# Patient Record
Sex: FEMALE | Race: WHITE | Employment: FULL TIME | URBAN - METROPOLITAN AREA
[De-identification: names, ages, dates, MRNs, and addresses within clinical notes are randomized per-mention and may not be internally consistent; named-entity substitution may affect disease eponyms.]

---

## 2017-05-09 ENCOUNTER — TRANSFERRED RECORDS (OUTPATIENT)
Dept: HEALTH INFORMATION MANAGEMENT | Facility: CLINIC | Age: 52
End: 2017-05-09

## 2017-05-12 ENCOUNTER — CARE COORDINATION (OUTPATIENT)
Dept: CARDIOLOGY | Facility: CLINIC | Age: 52
End: 2017-05-12

## 2017-05-12 DIAGNOSIS — R00.2 PALPITATIONS: ICD-10-CM

## 2017-05-12 DIAGNOSIS — Z82.49 FAMILY HISTORY OF CARDIOMYOPATHY: Primary | ICD-10-CM

## 2017-05-26 ENCOUNTER — ALLIED HEALTH/NURSE VISIT (OUTPATIENT)
Dept: CARDIOLOGY | Facility: CLINIC | Age: 52
End: 2017-05-26
Attending: INTERNAL MEDICINE
Payer: COMMERCIAL

## 2017-05-26 ENCOUNTER — HOSPITAL ENCOUNTER (OUTPATIENT)
Dept: MRI IMAGING | Facility: CLINIC | Age: 52
Discharge: HOME | End: 2017-05-26
Attending: INTERNAL MEDICINE | Admitting: INTERNAL MEDICINE
Payer: COMMERCIAL

## 2017-05-26 DIAGNOSIS — R00.2 PALPITATIONS: Primary | ICD-10-CM

## 2017-05-26 DIAGNOSIS — Z82.49 FAMILY HISTORY OF CARDIOMYOPATHY: ICD-10-CM

## 2017-05-26 PROCEDURE — 75561 CARDIAC MRI FOR MORPH W/DYE: CPT | Mod: 26 | Performed by: INTERNAL MEDICINE

## 2017-05-26 PROCEDURE — 0296T ZIO PATCH HOLTER: CPT | Mod: ZF

## 2017-05-26 PROCEDURE — 25000128 H RX IP 250 OP 636: Performed by: INTERNAL MEDICINE

## 2017-05-26 PROCEDURE — 0296T ZZHC  EXT ECG > 48HR TO 21 DAY RCRD W/CONECT INTL RCRD: CPT | Mod: ZF

## 2017-05-26 PROCEDURE — 75561 CARDIAC MRI FOR MORPH W/DYE: CPT

## 2017-05-26 PROCEDURE — 0298T ZZC EXT ECG > 48HR TO 21 DAY REVIEW AND INTERPRETATN: CPT | Performed by: INTERNAL MEDICINE

## 2017-05-26 PROCEDURE — A9585 GADOBUTROL INJECTION: HCPCS | Performed by: INTERNAL MEDICINE

## 2017-05-26 RX ORDER — GADOBUTROL 604.72 MG/ML
10 INJECTION INTRAVENOUS ONCE
Status: COMPLETED | OUTPATIENT
Start: 2017-05-26 | End: 2017-05-26

## 2017-05-26 RX ADMIN — GADOBUTROL 10 ML: 604.72 INJECTION INTRAVENOUS at 09:54

## 2017-05-26 NOTE — NURSING NOTE
Per Dr. Madison Sorenson, patient to have 48hr Zio monitor placed.  Diagnosis: Palpitations  Monitor placed: Yes  Patient Instructed: Yes  Patient verbalized understanding: Yes  Holter # [id number]  Documented by MAMIE Campbell and placed by Tamara Rodríguez

## 2017-05-26 NOTE — MR AVS SNAPSHOT
After Visit Summary   5/26/2017    Uzma Clemons    MRN: [id number]           Patient Information     Date Of Birth          1965        Visit Information        Provider Department      5/26/2017 11:00 AM Tech, Uc Cvc Monitor, [facility]        Today's Diagnoses     Palpitations    -  1       Follow-ups after your visit        Who to contact     If you have questions or need follow up information about today's clinic visit or your schedule please contact [facility] directly at [phone].  Normal or non-critical lab and imaging results will be communicated to you by ffk environmenthart, letter or phone within 4 business days after the clinic has received the results. If you do not hear from us within 7 days, please contact the clinic through ffk environmenthart or phone. If you have a critical or abnormal lab result, we will notify you by phone as soon as possible.  Submit refill requests through Nuve or call your pharmacy and they will forward the refill request to us. Please allow 3 business days for your refill to be completed.          Additional Information About Your Visit        MyChart Information     Nuve gives you secure access to your electronic health record. If you see a primary care provider, you can also send messages to your care team and make appointments. If you have questions, please call your primary care clinic.  If you do not have a primary care provider, please call [phone] and they will assist you.        Care EveryWhere ID     This is your Care EveryWhere ID. This could be used by other organizations to access your [place] medical records  [id number]         Blood Pressure from Last 3 Encounters:   11/28/16 106/64   10/20/15 112/80   08/19/14 122/70    Weight from Last 3 Encounters:   11/28/16 75.3 kg (166 lb)   10/20/15 74.8 kg (165 lb)   08/19/14 70.8 kg (156 lb)              We Performed the Following     Holter monitor 48 hour     Ziopatch Holter  Monitor - Adult        Primary Care Provider Office Phone # Fax #    Radha Patel [phone]       [facility] [address] [address]        Thank you!     Thank you for choosing [facility]  for your care. Our goal is always to provide you with excellent care. Hearing back from our patients is one way we can continue to improve our services. Please take a few minutes to complete the written survey that you may receive in the mail after your visit with us. Thank you!             Your Updated Medication List - Protect others around you: Learn how to safely use, store and throw away your medicines at www.disposemymeds.org.          This list is accurate as of: 5/26/17 11:47 AM.  Always use your most recent med list.                   Brand Name Dispense Instructions for use    EFFEXOR XR 37.5 MG 24 hr capsule   Generic drug:  venlafaxine      Take 37.5 mg by mouth daily       flecainide acetate 150 MG Tabs     90 tablet    1/2 pill by mouth every 12 hours       metoprolol 50 MG tablet    LOPRESSOR     Take 50 mg by mouth 2 times daily       order for DME      CPAP every night       vitamin D 1000 UNITS capsule      Take 1 capsule by mouth daily       ZOMIG ZMT 2.5 MG ODT tab   Generic drug:  ZOLMitriptan     12    1 TAB AT THE START OF THE ATTACK, THEN AS DIRECT

## 2017-06-17 ENCOUNTER — HEALTH MAINTENANCE LETTER (OUTPATIENT)
Age: 52
End: 2017-06-17

## 2017-08-17 ENCOUNTER — PRE VISIT (OUTPATIENT)
Dept: CARDIOLOGY | Facility: CLINIC | Age: 52
End: 2017-08-17

## 2017-08-18 ENCOUNTER — OFFICE VISIT (OUTPATIENT)
Dept: CARDIOLOGY | Facility: CLINIC | Age: 52
End: 2017-08-18
Attending: INTERNAL MEDICINE
Payer: COMMERCIAL

## 2017-08-18 ENCOUNTER — OFFICE VISIT (OUTPATIENT)
Dept: CARDIOLOGY | Facility: CLINIC | Age: 52
End: 2017-08-18
Attending: GENETIC COUNSELOR, MS
Payer: COMMERCIAL

## 2017-08-18 VITALS
BODY MASS INDEX: 25.46 KG/M2 | HEIGHT: 68 IN | DIASTOLIC BLOOD PRESSURE: 95 MMHG | OXYGEN SATURATION: 98 % | SYSTOLIC BLOOD PRESSURE: 145 MMHG | HEART RATE: 66 BPM | WEIGHT: 168 LBS

## 2017-08-18 DIAGNOSIS — R00.2 PALPITATIONS: ICD-10-CM

## 2017-08-18 DIAGNOSIS — I48.0 PAROXYSMAL ATRIAL FIBRILLATION (H): Primary | ICD-10-CM

## 2017-08-18 DIAGNOSIS — Z82.41 FAMILY HISTORY OF SUDDEN CARDIAC DEATH IN BROTHER: Primary | ICD-10-CM

## 2017-08-18 DIAGNOSIS — I42.8 ARRHYTHMOGENIC LEFT VENTRICULAR DYSPLASIA (H): ICD-10-CM

## 2017-08-18 PROCEDURE — 96040 ZZH GENETIC COUNSELING, EACH 30 MINUTES: CPT | Mod: ZF | Performed by: GENETIC COUNSELOR, MS

## 2017-08-18 PROCEDURE — 99212 OFFICE O/P EST SF 10 MIN: CPT | Mod: ZF

## 2017-08-18 PROCEDURE — 93005 ELECTROCARDIOGRAM TRACING: CPT | Mod: ZF

## 2017-08-18 PROCEDURE — 93010 ELECTROCARDIOGRAM REPORT: CPT | Mod: ZP | Performed by: INTERNAL MEDICINE

## 2017-08-18 PROCEDURE — 99212 OFFICE O/P EST SF 10 MIN: CPT | Mod: ZP | Performed by: INTERNAL MEDICINE

## 2017-08-18 RX ORDER — METOPROLOL SUCCINATE 50 MG/1
50 TABLET, EXTENDED RELEASE ORAL DAILY
Qty: 90 TABLET | Refills: 3 | Status: SHIPPED | OUTPATIENT
Start: 2017-08-18 | End: 2017-08-18

## 2017-08-18 RX ORDER — METOPROLOL SUCCINATE 50 MG/1
50 TABLET, EXTENDED RELEASE ORAL DAILY
Qty: 90 TABLET | Refills: 3 | Status: SHIPPED | OUTPATIENT
Start: 2017-08-18 | End: 2020-02-12

## 2017-08-18 ASSESSMENT — PAIN SCALES - GENERAL: PAINLEVEL: NO PAIN (0)

## 2017-08-18 NOTE — LETTER
Date:October 10, 2017      Provider requested that no letter be sent. Do not send.       [facility] Health Information

## 2017-08-18 NOTE — MR AVS SNAPSHOT
"              After Visit Summary   8/18/2017    Uzma Clemons    MRN: [id number]           Patient Information     Date Of Birth          1965        Visit Information        Provider Department      8/18/2017 10:00 AM Madison Sorenson MD [facility]        Today's Diagnoses     Atrial fibrillation (H)    -  1    Arrhythmogenic left ventricular dysplasia (H)          Care Instructions    You were seen today in the Adult Congenital and Cardiovascular Genetics Clinic at the [facility].    Cardiology Providers you saw during your visit:  Dr. Madison Sorenson     Diagnosis:  Family History of Possible LDAC    Results:  The results of your MRI and Holter were discussed with you today.    Recommendations:    1.  Continue to eat a heart healthy, low salt diet.  2.  Please follow the exercise restrictions outlined by Dr. Sorenson - please refer to the sheet provided.        -Must be allowed to speak 5-7 word sentences while exercising.   3.  Continue to observe good oral hygiene, with regular dental visits.  4.  Please stop your Flecainide.  5. Please start Metoprolol XL 50 mg daily.       Vitals:    08/18/17 1011 08/18/17 1014   BP: (!) 147/96 (!) 145/95   BP Location: Right arm Right arm   Patient Position: Chair Chair   Cuff Size: Adult Regular Adult Regular   Pulse: 66    SpO2: 98%    Weight: 76.2 kg (168 lb)    Height: 1.727 m (5' 8\")      Exercise restrictions:   Yes__X__  No____         If yes, list restrictions:  Must be allowed to rest if fatigued or SOB      Work restrictions:  Yes____  No_X___         If yes, list restrictions:    FASTING CHOLESTEROL was checked in the last 5 years YES___  NO_X__   Continue to eat a heart healthy, low salt diet.         ____ Fasting lipid panel order today         ____ No changes in medications          ____ I recommend the following changes in your cholesterol medications.:          ____ Please follow up for cholesterol screening at your primary " care physician      Follow-up:  Follow up with Dr. Sorenson after your have seen Dr. Barlow.  Please see Dr. Barlow on same day that Gustabo sees him September 11th.       For after hours urgent needs, call [phone] and ask to speak to the Adult Congenital Physician on call.  Mention Job Code 0401.    For emergencies call 911.    For any scheduling needs and to contact your nurse in the Adult Congenital and Cardiovascular Genetics Clinic, please call Mack Piña, Procedure , at [phone].    Thank you for your visit today!  If you have questions or concerns about today's visit, please call me.    Elías Graves RN, BSN  Cardiology Care Coordinator  [facility]  [email or web address]  Ph.[phone]    [facility]  [facility]  Mail Code [id number]  [address] [place]  [address]           Follow-ups after your visit        Follow-up notes from your care team     Return for CV Genetics Follow up with Dr. Sorenson.      Who to contact     If you have questions or need follow up information about today's clinic visit or your schedule please contact [facility] directly at [phone].  Normal or non-critical lab and imaging results will be communicated to you by MyChart, letter or phone within 4 business days after the clinic has received the results. If you do not hear from us within 7 days, please contact the clinic through Hootsuitehart or phone. If you have a critical or abnormal lab result, we will notify you by phone as soon as possible.  Submit refill requests through [email or web address] or call your pharmacy and they will forward the refill request to us. Please allow 3 business days for your refill to be completed.          Additional Information About Your Visit        [email or web address] Information     [email or web address] gives you secure access to your electronic health record. If you see a  "primary care provider, you can also send messages to your care team and make appointments. If you have questions, please call your primary care clinic.  If you do not have a primary care provider, please call [phone] and they will assist you.        Care EveryWhere ID     This is your Care EveryWhere ID. This could be used by other organizations to access your [place] medical records  [id number]        Your Vitals Were     Pulse Height Pulse Oximetry BMI (Body Mass Index)          66 1.727 m (5' 8\") 98% 25.54 kg/m2         Blood Pressure from Last 3 Encounters:   08/18/17 (!) 145/95   11/28/16 106/64   10/20/15 112/80    Weight from Last 3 Encounters:   08/18/17 76.2 kg (168 lb)   11/28/16 75.3 kg (166 lb)   10/20/15 74.8 kg (165 lb)              We Performed the Following     EKG 12-lead, tracing only (Future)          Today's Medication Changes          These changes are accurate as of: 8/18/17 11:21 AM.  If you have any questions, ask your nurse or doctor.               Start taking these medicines.        Dose/Directions    metoprolol 50 MG 24 hr tablet   Commonly known as:  TOPROL-XL   Used for:  Atrial fibrillation (H), Arrhythmogenic left ventricular dysplasia (H)   Started by:  Madison Sorenson MD        Dose:  50 mg   Take 1 tablet (50 mg) by mouth daily   Quantity:  90 tablet   Refills:  3         Stop taking these medicines if you haven't already. Please contact your care team if you have questions.     metoprolol 50 MG tablet   Commonly known as:  LOPRESSOR   Stopped by:  Madison Sorenson MD                Where to get your medicines      These medications were sent to [facility], [place] - [address]  [address] [address]     Phone:  [phone]     metoprolol 50 MG 24 hr tablet                Primary Care Provider Office Phone # Fax #    Radha Patel [phone]       [facility] [address] [facility] [address]  [facility] [address]        Equal Access to Services     [facility] Center[facility] : Hadii aad ku hadvalenteo Somickyali, waaxda luqadaha, qaybta kaalmada haleighchilo, waxamparo jakubin hayaafarhana marcmusaferny fisher. So [facility] [phone].    ATENCIÓN: Si habla español, tiene a burgos disposición servicios gratuitos de asistencia lingüística. Patame al [phone].    We comply with applicable federal civil rights laws and [place] laws. We do not discriminate on the basis of race, color, national origin, age, disability sex, sexual orientation or gender identity.            Thank you!     Thank you for choosing [facility]  for your care. Our goal is always to provide you with excellent care. Hearing back from our patients is one way we can continue to improve our services. Please take a few minutes to complete the written survey that you may receive in the mail after your visit with us. Thank you!             Your Updated Medication List - Protect others around you: Learn how to safely use, store and throw away your medicines at www.disposemymeds.org.          This list is accurate as of: 8/18/17 11:21 AM.  Always use your most recent med list.                   Brand Name Dispense Instructions for use Diagnosis    EFFEXOR XR 37.5 MG 24 hr capsule   Generic drug:  venlafaxine      Take 37.5 mg by mouth daily        metoprolol 50 MG 24 hr tablet    TOPROL-XL    90 tablet    Take 1 tablet (50 mg) by mouth daily    Atrial fibrillation (H), Arrhythmogenic left ventricular dysplasia (H)       order for DME      CPAP every night        vitamin D 1000 UNITS capsule      Take 1 capsule by mouth daily        ZOMIG ZMT 2.5 MG ODT tab   Generic drug:  ZOLMitriptan     12    1 TAB AT THE START OF THE ATTACK, THEN AS DIRECT

## 2017-08-18 NOTE — MR AVS SNAPSHOT
After Visit Summary   8/18/2017    Uzma Clemons    MRN: [id number]           Patient Information     Date Of Birth          1965        Visit Information        Provider Department      8/18/2017 9:00 AM Luba Michelle [facility]        Today's Diagnoses     Family history of sudden cardiac death in brother    -  1    Palpitations           Follow-ups after your visit        Who to contact     If you have questions or need follow up information about today's clinic visit or your schedule please contact [facility] directly at [phone].  Normal or non-critical lab and imaging results will be communicated to you by Tsavo Mediahart, letter or phone within 4 business days after the clinic has received the results. If you do not hear from us within 7 days, please contact the clinic through Agency Spottert or phone. If you have a critical or abnormal lab result, we will notify you by phone as soon as possible.  Submit refill requests through Rezora or call your pharmacy and they will forward the refill request to us. Please allow 3 business days for your refill to be completed.          Additional Information About Your Visit        MyChart Information     Rezora gives you secure access to your electronic health record. If you see a primary care provider, you can also send messages to your care team and make appointments. If you have questions, please call your primary care clinic.  If you do not have a primary care provider, please call [phone] and they will assist you.        Care EveryWhere ID     This is your Care EveryWhere ID. This could be used by other organizations to access your [place] medical records  [id number]         Blood Pressure from Last 3 Encounters:   08/18/17 (!) 145/95   11/28/16 106/64   10/20/15 112/80    Weight from Last 3 Encounters:   08/18/17 76.2 kg (168 lb)   11/28/16 75.3 kg (166 lb)   10/20/15 74.8 kg (165 lb)              Today, you had  the following     No orders found for display         Today's Medication Changes          These changes are accurate as of: 8/18/17 11:27 AM.  If you have any questions, ask your nurse or doctor.               Start taking these medicines.        Dose/Directions    metoprolol 50 MG 24 hr tablet   Commonly known as:  TOPROL-XL   Used for:  Atrial fibrillation (H), Arrhythmogenic left ventricular dysplasia (H)   Started by:  Madison Sorenson MD        Dose:  50 mg   Take 1 tablet (50 mg) by mouth daily   Quantity:  90 tablet   Refills:  3         Stop taking these medicines if you haven't already. Please contact your care team if you have questions.     metoprolol 50 MG tablet   Commonly known as:  LOPRESSOR   Stopped by:  Madison Sorenson MD                Where to get your medicines      These medications were sent to [place] [address] [place] [address] [address]     Phone:  [phone]     metoprolol 50 MG 24 hr tablet                Primary Care Provider Office Phone # Fax #    Radha Jorge [phone]       [facility] [address] [address]        Equal Access to Services     [facility] AH: Hadii aad ku hadasho Soomaali, waaxda luqadaha, qaybta kaalmada adeegyada, clark callin haytyreln carmelita fisher. So [facility] [phone].    ATENCIÓN: Si habla español, tiene a burgos disposición servicios gratuitos de asistencia lingüística. Indigo al [phone].    We comply with applicable federal civil rights laws and [place] laws. We do not discriminate on the basis of race, color, national origin, age, disability sex, sexual orientation or gender identity.            Thank you!     Thank you for choosing [facility]  for your care. Our goal is always to provide you with excellent care. Hearing back from our patients is one way we can continue to improve our services. Please take a few minutes to complete  the written survey that you may receive in the mail after your visit with us. Thank you!             Your Updated Medication List - Protect others around you: Learn how to safely use, store and throw away your medicines at www.disposemymeds.org.          This list is accurate as of: 8/18/17 11:27 AM.  Always use your most recent med list.                   Brand Name Dispense Instructions for use Diagnosis    EFFEXOR XR 37.5 MG 24 hr capsule   Generic drug:  venlafaxine      Take 37.5 mg by mouth daily        metoprolol 50 MG 24 hr tablet    TOPROL-XL    90 tablet    Take 1 tablet (50 mg) by mouth daily    Atrial fibrillation (H), Arrhythmogenic left ventricular dysplasia (H)       order for DME      CPAP every night        vitamin D 1000 UNITS capsule      Take 1 capsule by mouth daily        ZOMIG ZMT 2.5 MG ODT tab   Generic drug:  ZOLMitriptan     12    1 TAB AT THE START OF THE ATTACK, THEN AS DIRECT

## 2017-08-18 NOTE — Clinical Note
8/18/2017      RE: Uzma Clemons  [address] [facility] [address]       Dear Colleague,    Thank you for the opportunity to participate in the care of your patient, Uzma Clemons, at the [facility]. Please see a copy of my visit note below.    No notes on file    Please do not hesitate to contact me if you have any questions/concerns.     Sincerely,     Madison Sorenson MD

## 2017-08-18 NOTE — NURSING NOTE
Chief Complaint   Patient presents with     Follow Up For     heart problem -- 52 yr old female with PMH significant for paroxysmal atrial fib with family history of suspected LDAC presenting for evaluation         Med Reconcile: Reviewed and verified all current medications with the patient. The updated medication list was printed and given to the patient.  New Medication: Patient was educated regarding newly prescribed medication, including discussion of  the indication, administration, side effects, and when to report to MD or RN. Patient demonstrated understanding of this information and agreed to call with further questions or concerns.  Return Appointment: Patient given instructions regarding scheduling next clinic visit. Patient demonstrated understanding of this information and agreed to call with further questions or concerns.  Medication Change: Patient was educated regarding prescribed medication change, including discussion of the indication, administration, side effects, and when to report to MD or RN. Patient demonstrated understanding of this information and agreed to call with further questions or concerns.  Patient stated she understood all health information given and agreed to call with further questions or concerns.     Elías Graves RN, BSN  Cardiology Care Coordinator  [facility]  [email or web address]  [phone]

## 2017-08-18 NOTE — NURSING NOTE
Chief Complaint   Patient presents with     Follow Up For     heart problem -- 52 yr old female with PMH significant for paroxysmal atrial fib with family history of suspected LDAC presenting for evaluation     Vitals were taken and medications were reconciled. EKG was performed.  MAMIE Campbell  10:08 AM

## 2017-08-18 NOTE — LETTER
"2017      RE: Uzma Clemons  [address] [facility] [address]       Dear Colleague,    Thank you for the opportunity to participate in the care of your patient, Uzma Clemons, at the [facility] HEART [facility]. Please see a copy of my visit note below.    Here is a copy of the progress note from your recent genetic counseling visit to the [facility] on Date: 2017.    PROGRESS NOTE: Uzma was referred by Dr. Sorenson for genetic counseling due to the family history of myocarditis or arrhythmogenic cardiomyopathy in her son and sudden cardiac death in her brother.  I had the opportunity to meet with Uzma and her mom Fidel today to discuss the genetic component of arrhythmogenic cardiomyopathy (ACM) and testing options available to the family.     MEDICAL HISTORY: Uzma reports that about 8 years ago she started experiencing dizziness and near fainting episodes.  She was diagnosed with atrial fibrillation and flutter.  She had an ablation and has experienced less symptoms since that time.  However, more recently, she has also complained of some palpitations and fatigue.  EKG, Holter monitor, and MRI were performed in May.  Results of those tests will be discussed in more detail at appointment with Dr. Sorenson.    FAMILY HISTORY: A four generation family history was obtained at office visit on 2017.  (Please see scanned pedigree for details).  Family history was significant for the following: Татьяна's son experienced chest pains, back tightness, and fever in 2017.  MRI lists the primary differential diagnosis is viral myocarditis versus a\" hot phase\" of left dominant arrhythmogenic cardiomyopathy (LDAC.) Татьяна's brother  suddenly at 49 years of age.  Autopsy revealed cardiomegaly and moderate fat investment on heart surface.  Another brother  at 54 years.  Autopsy, not yet reviewed by us, " stated brain aneurysm as cause of death.  They are unaware of any heart findings.  A third brother has LV hypertrophy, reduced LVEF, and some LA enlargement. Татьяна's mother also has a history of arrhythmia since 63 years.  She has reported palpitations and dizziness. Little information is known about Татьяна's father.  Although he  around 55 years from suspected cancer.  There is no additional history of cardiomyopathy, arrythmias, heart attacks, fainting, sudden cardiac death, genetic conditions, or birth defects.   DISCUSSION:  Arrhythmogenic cardiomyopathy (ACM), has different subtypes including arrhythmogenic right ventricular cardiomyopathy/dysplasia (ARVC/D) and left dominant arrhythmogenic cardiomyopathy (LDAC).  ACM is a difficult condition to diagnose.  It is characterized by fatty replacement and fibrosis of the heart muscle which interrupts the electrical signals being sent between the heart cells. This loss of connection between the heart cells results in arrhythmias, including palpitations, fainting, and sudden death.  ACM is frequently diagnosed as dilated cardiomyopathy during the initial phase. Explained that a good portion of ACM cases have a genetic component.   Reviewed autosomal dominant (AD) inheritance pattern most commonly associated with AC, including the 50% risk for recurrence. Explained that AC gene mutations are associated with reduced penetrance and variable expressivity, meaning that individuals who carry a gene mutation may or may not get the disease and onset and severity can vary from one family member to the next. This explains why you may not see cardiomyopathy in each generation of a family.   At least 10 genes have been found to be related to ARVC.  Genes specific to LDAC are less well definied. Genetic testing currently identifies mutations in about 40% of idiopathic ARVC cases.  The detection rate with LDAC is not well known. Reviewed capabilities, limitations, and  logistics of testing. Genetic testing is recommended in the most severely affected individual in the family.  In this case that would be Татьяна's son Gustabo.   Explained three possible outcomes of genetic testing including: positive identification of a mutation, no mutation identified, and identification of a variant of unknown significance (VUS). If a mutation is identified, presymptomatic testing would be available to at risk family members. If no mutation is identified, it does not rule out the possibility of a genetic component to this disease. Family members could still be at risk for developing the same condition. If a VUS is identified, it is unclear if the mutation is disease causing or just a normal variation. It may take time and possibly additional testing to determine the meaning of a VUS result.   Test results take approximately 4 weeks on average. Discussed cost of testing, typically thousands of dollars.  Explained that the [facility] work with insurance company directly to determin out of pocket costs and notify patient if they are expected to exceed $100.     Discussed pros and cons of genetic testing. Explained that testing results could confirm a diagnosis and determine the cause for symptoms in her son Gustabo's myocarditis. If a mutation is identified, presymptomatic testing is available to all at risk relatives. Reiterated that a negative genetic test result will NOT rule out the possibility of a genetic component to his myocarditis.   All questions answered at this time.   PLAN: Татьяна is interested in the idea of genetic testing and feels that it could provide some answers for her family.  She will talk with Gustabo about pursuing testing and have him contact me to set up an appointment.      TOTAL TIME SPENT IN COUNSELIN minutes    Luba Michelle MS  Certified Genetic Counselor  [facility]

## 2017-08-18 NOTE — PATIENT INSTRUCTIONS
"You were seen today in the Adult Cong[facility]ital and Cardiovascular Genetics Clinic at the [facility].    Cardiology Providers you saw during your visit:  Dr. Madison Sorenson     Diagnosis:  Family History of Possible LDAC    Results:  The results of your MRI and Holter were discussed with you today.    Recommendations:    1.  Continue to eat a heart healthy, low salt diet.  2.  Please follow the exercise restrictions outlined by Dr. Sorenson - please refer to the sheet provided.        -Must be allowed to speak 5-7 word sentences while exercising.   3.  Continue to observe good oral hygiene, with regular dental visits.  4.  Please stop your Flecainide.  5. Please start Metoprolol XL 50 mg daily.       Vitals:    08/18/17 1011 08/18/17 1014   BP: (!) 147/96 (!) 145/95   BP Location: Right arm Right arm   Patient Position: Chair Chair   Cuff Size: Adult Regular Adult Regular   Pulse: 66    SpO2: 98%    Weight: 76.2 kg (168 lb)    Height: 1.727 m (5' 8\")      Exercise restrictions:   Yes__X__  No____         If yes, list restrictions:  Must be allowed to rest if fatigued or SOB      Work restrictions:  Yes____  No_X___         If yes, list restrictions:    FASTING CHOLESTEROL was checked in the last 5 years YES___  NO_X__   Continue to eat a heart healthy, low salt diet.         ____ Fasting lipid panel order today         ____ No changes in medications          ____ I recommend the following changes in your cholesterol medications.:          ____ Please follow up for cholesterol screening at your primary care physician      Follow-up:  Follow up with Dr. Sorneson after your have seen Dr. Barlow.  Please see Dr. Barlow on same day that Gustabo sees him September 11th.       For after hours urgent needs, call [phone] and ask to speak to the Adult Congenital Physician on call.  Mention Job Code [id number].    For emergencies call 911.    For any scheduling needs and to contact your nurse in the Adult Congenital and " Cardiovascular Genetics Clinic, please call Mack Piña, Procedure , at [phone].    Thank you for your visit today!  If you have questions or concerns about today's visit, please call me.    Elías Graves RN, BSN  Cardiology Care Coordinator  [facility]  [email or web address]  Ph.[phone]    [facility]  [facility]   Clin[facility] and [facility] Center  Mail Code [id number]  [address] [place], [place] [address]

## 2017-08-18 NOTE — PROGRESS NOTES
"Here is a copy of the progress note from your recent genetic counseling visit to the [facility] on Date: 2017.    PROGRESS NOTE: Uzma was referred by Dr. Sorenson for genetic counseling due to the family history of myocarditis or arrhythmogenic cardiomyopathy in her son and sudden cardiac death in her brother.  I had the opportunity to meet with Uzma and her mom Fidel today to discuss the genetic component of arrhythmogenic cardiomyopathy (ACM) and testing options available to the family.     MEDICAL HISTORY: Uzma reports that about 8 years ago she started experiencing dizziness and near fainting episodes.  She was diagnosed with atrial fibrillation and flutter.  She had an ablation and has experienced less symptoms since that time.  However, more recently, she has also complained of some palpitations and fatigue.  EKG, Holter monitor, and MRI were performed in May.  Results of those tests will be discussed in more detail at appointment with Dr. Sorenson.    FAMILY HISTORY: A four generation family history was obtained at office visit on 2017.  (Please see scanned pedigree for details).  Family history was significant for the following: Татьяна's son experienced chest pains, back tightness, and fever in 2017.  MRI lists the primary differential diagnosis is viral myocarditis versus a\" hot phase\" of left dominant arrhythmogenic cardiomyopathy (LDAC.) Татьяна's brother  suddenly at 49 years of age.  Autopsy revealed cardiomegaly and moderate fat investment on heart surface.  Another brother  at 54 years.  Autopsy, not yet reviewed by us, stated brain aneurysm as cause of death.  They are unaware of any heart findings.  A third brother has LV hypertrophy, reduced LVEF, and some LA enlargement. Татьяна's mother also has a history of arrhythmia since 63 years.  She has reported palpitations and dizziness. Little information is known about Татьяна's father.  " Although he  around 55 years from suspected cancer.  There is no additional history of cardiomyopathy, arrythmias, heart attacks, fainting, sudden cardiac death, genetic conditions, or birth defects.   DISCUSSION:  Arrhythmogenic cardiomyopathy (ACM), has different subtypes including arrhythmogenic right ventricular cardiomyopathy/dysplasia (ARVC/D) and left dominant arrhythmogenic cardiomyopathy (LDAC).  ACM is a difficult condition to diagnose.  It is characterized by fatty replacement and fibrosis of the heart muscle which interrupts the electrical signals being sent between the heart cells. This loss of connection between the heart cells results in arrhythmias, including palpitations, fainting, and sudden death.  ACM is frequently diagnosed as dilated cardiomyopathy during the initial phase. Explained that a good portion of ACM cases have a genetic component.   Reviewed autosomal dominant (AD) inheritance pattern most commonly associated with AC, including the 50% risk for recurrence. Explained that AC gene mutations are associated with reduced penetrance and variable expressivity, meaning that individuals who carry a gene mutation may or may not get the disease and onset and severity can vary from one family member to the next. This explains why you may not see cardiomyopathy in each generation of a family.   At least 10 genes have been found to be related to ARVC.  Genes specific to LDAC are less well definied. Genetic testing currently identifies mutations in about 40% of idiopathic ARVC cases.  The detection rate with LDAC is not well known. Reviewed capabilities, limitations, and logistics of testing. Genetic testing is recommended in the most severely affected individual in the family.  In this case that would be Татьяна's son Gustabo.   Explained three possible outcomes of genetic testing including: positive identification of a mutation, no mutation identified, and identification of a variant of unknown  significance (VUS). If a mutation is identified, presymptomatic testing would be available to at risk family members. If no mutation is identified, it does not rule out the possibility of a genetic component to this disease. Family members could still be at risk for developing the same condition. If a VUS is identified, it is unclear if the mutation is disease causing or just a normal variation. It may take time and possibly additional testing to determine the meaning of a VUS result.   Test results take approximately 4 weeks on average. Discussed cost of testing, typically thousands of dollars.  Explained that the [facility] labs work with insurance company directly to determin out of pocket costs and notify patient if they are expected to exceed $100.     Discussed pros and cons of genetic testing. Explained that testing results could confirm a diagnosis and determine the cause for symptoms in her son Gustabo's myocarditis. If a mutation is identified, presymptomatic testing is available to all at risk relatives. Reiterated that a negative genetic test result will NOT rule out the possibility of a genetic component to his myocarditis.   All questions answered at this time.   PLAN: Татьяна is interested in the idea of genetic testing and feels that it could provide some answers for her family.  She will talk with Gustabo about pursuing testing and have him contact me to set up an appointment.      TOTAL TIME SPENT IN COUNSELIN minutes    Luba Michelle MS  Certified Genetic Counselor  [facility]

## 2017-08-21 LAB — INTERPRETATION ECG - MUSE: NORMAL

## 2017-09-18 ENCOUNTER — TELEPHONE (OUTPATIENT)
Dept: CARDIOLOGY | Facility: CLINIC | Age: 52
End: 2017-09-18

## 2017-09-18 NOTE — LETTER
September 18, 2017      TO: Uzma Clemons  [address] [facility] [address]       Dear Uzma,    Our records indicate that it is time for you to schedule your visit with the [facility] in the CARDIOVASCULAR CONGENITAL CLINIC at the [facility] ([facility]).      To make your appointment, please call: [phone], Monday - Friday, 8 A.M. - 4:30 P.M (Central Time Zone).    Please check with your insurance company about your benefits.  Some insurance plans provide different coverage levels for services at [facility] hospital-based clinics.     We look forward to hearing from you.    Sincerely,    Mack Piña  Clinic Coordinator  [facility]  Office: [phone]  Fax: [phone]

## 2017-10-30 ENCOUNTER — OFFICE VISIT (OUTPATIENT)
Dept: CARDIOLOGY | Facility: CLINIC | Age: 52
End: 2017-10-30
Attending: INTERNAL MEDICINE
Payer: COMMERCIAL

## 2017-10-30 VITALS
BODY MASS INDEX: 25.61 KG/M2 | OXYGEN SATURATION: 98 % | SYSTOLIC BLOOD PRESSURE: 127 MMHG | WEIGHT: 169 LBS | DIASTOLIC BLOOD PRESSURE: 84 MMHG | HEIGHT: 68 IN | HEART RATE: 53 BPM

## 2017-10-30 DIAGNOSIS — I42.8 ARRHYTHMOGENIC RIGHT VENTRICULAR DYSPLAS (H): ICD-10-CM

## 2017-10-30 DIAGNOSIS — I48.0 PAROXYSMAL ATRIAL FIBRILLATION (H): Primary | ICD-10-CM

## 2017-10-30 PROCEDURE — 99213 OFFICE O/P EST LOW 20 MIN: CPT | Mod: ZP | Performed by: INTERNAL MEDICINE

## 2017-10-30 PROCEDURE — 99212 OFFICE O/P EST SF 10 MIN: CPT | Mod: ZF

## 2017-10-30 ASSESSMENT — PAIN SCALES - GENERAL: PAINLEVEL: NO PAIN (0)

## 2017-10-30 NOTE — NURSING NOTE
Chief Complaint   Patient presents with     New Patient     Afib     Vitals were taken and medications were reconciled.  MAMIE Campbell  5:11 PM

## 2017-10-30 NOTE — MR AVS SNAPSHOT
"              After Visit Summary   10/30/2017    Uzma Clemons    MRN: [id number]           Patient Information     Date Of Birth          1965        Visit Information        Provider Department      10/30/2017 6:00 PM Jw Barlow MD [facility]        Today's Diagnoses     Paroxysmal atrial fibrillation (H)    -  1    Arrhythmogenic right ventricular dysplas (H)           Follow-ups after your visit        Who to contact     If you have questions or need follow up information about today's clinic visit or your schedule please contact [facility] directly at [phone].  Normal or non-critical lab and imaging results will be communicated to you by IPexperthart, letter or phone within 4 business days after the clinic has received the results. If you do not hear from us within 7 days, please contact the clinic through [employer] or phone. If you have a critical or abnormal lab result, we will notify you by phone as soon as possible.  Submit refill requests through [employer] or call your pharmacy and they will forward the refill request to us. Please allow 3 business days for your refill to be completed.          Additional Information About Your Visit        MyChart Information     [employer] gives you secure access to your electronic health record. If you see a primary care provider, you can also send messages to your care team and make appointments. If you have questions, please call your primary care clinic.  If you do not have a primary care provider, please call [phone] and they will assist you.        Care EveryWhere ID     This is your Care EveryWhere ID. This could be used by other organizations to access your [place] medical records  [id number]        Your Vitals Were     Pulse Height Pulse Oximetry BMI (Body Mass Index)          53 1.727 m (5' 8\") 98% 25.7 kg/m2         Blood Pressure from Last 3 Encounters:   10/30/17 127/84   08/18/17 (!) 145/95   11/28/16 106/64    Weight " from Last 3 Encounters:   10/30/17 76.7 kg (169 lb)   08/18/17 76.2 kg (168 lb)   11/28/16 75.3 kg (166 lb)              Today, you had the following     No orders found for display       Primary Care Provider Office Phone # Fax Isreal Patel [phone]       [facility] [address] [address]        Equal Access to Services     KENNETH BRAVO : Hadii aad ku hadasho Soomaali, waaxda luqadaha, qaybta kaalmada adeegyada, waxay idiin hayaan adeeg kharash lasendy . So [facility] [phone].    ATENCIÓN: Si flor barker, tiene a burgos disposición servicios gratuitos de asistencia lingüística. Llame al [phone].    We comply with applicable federal civil rights laws and [place] laws. We do not discriminate on the basis of race, color, national origin, age, disability, sex, sexual orientation, or gender identity.            Thank you!     Thank you for choosing [facility]  for your care. Our goal is always to provide you with excellent care. Hearing back from our patients is one way we can continue to improve our services. Please take a few minutes to complete the written survey that you may receive in the mail after your visit with us. Thank you!             Your Updated Medication List - Protect others around you: Learn how to safely use, store and throw away your medicines at www.disposemymeds.org.          This list is accurate as of: 10/30/17 11:59 PM.  Always use your most recent med list.                   Brand Name Dispense Instructions for use Diagnosis    EFFEXOR XR 37.5 MG 24 hr capsule   Generic drug:  venlafaxine      Take 37.5 mg by mouth daily        metoprolol 50 MG 24 hr tablet    TOPROL-XL    90 tablet    Take 1 tablet (50 mg) by mouth daily    Atrial fibrillation (H), Arrhythmogenic left ventricular dysplasia (H)       order for DME      CPAP every night        vitamin D 1000 UNITS capsule      Take 1 capsule by mouth daily        ZOMIG ZMT 2.5 MG ODT tab    Generic drug:  ZOLMitriptan     12    1 TAB AT THE START OF THE ATTACK, THEN AS DIRECT

## 2017-10-30 NOTE — LETTER
10/30/2017      RE: Uzma Clemons  [address]  [facility] [address]       Dear Colleague,    Thank you for the opportunity to participate in the care of your patient, Uzma Clemons, at the [facility] HEART [facility]. Please see a copy of my visit note below.    CC - arrhythmogenic right ventricular cardiomyopathy, atrial fibrillation     HPI:Our entire twenty-minute visit was spent reviewing her MRI, discussing further testing and risk of sudden cardiac death.    We reviewed that cardiac MR does not provide a definitive diagnosis but was suggestive of arrhythmogenic right ventricular cardiomyopathy.     We reviewed activity restrictions.     Lab data:        Examination:   MR CARDIAC W CONTRAST   Date:  5/26/2017 9:59 AM  Indication:  Family history of ischemic heart disease and other  diseases of the circulatory system  Comparison: None     Cardiac MRI with and without contrast was performed on a 1.5 T scanner  to evaluate myocardial morphology, function and viability, and to  assess for arrhythmogenic cardiomyopathy in a 51-year-old female with  paroxysmal atrial fibrillation and atrial flutter status post ablation  with family history of sudden death and possible left dominant  arrhythmogenic cardiomyopathy in a son.     1. The left ventricle is normal in cavity size and wall thickness. The  global systolic function is normal. The quantitative LV ejection  fraction is 57%. There are no regional wall motion abnormalities.      LV volumes absolute and indexed to BSA with age and gender-matched  normal values in parentheses (mean, 95% CI) are listed below:     EDV: 159 ml (126,  mL)  EDVI: 68 ml/m2 (79, 62-97 mL/m2)  ESV: 68 ml (51,29-74 mL)  ESVI: 36 ml/m2  (26, 15-37 mL/m2)     2. The right ventricle is normal in end-diastolic and end-systolic  volumes. The global systolic function is normal. The quantitative RV  ejection fraction is 60%. There is  hypokinesis of the apical third of  the right ventricular free wall. There appears to be one small area of  dyskinesia in the RV free wall. In the presence of normal RV size and  function, these regional abnormalities do not satisfy the Task Force  CMR criteria for the diagnosis of ARVC.     RV volumes absolute and indexed to BSA with age and gender-matched  normal values in parentheses (mean, 95% CI) are listed below:     EDV: 162 ml (124,  mL)  EDVI: 86 ml/m2 (72, 53-90 mL/m2)  ESV: 65 ml (42, 15-68 mL)  ESVI: 35 ml/m2  (24, 11-37 mL/m2)     3. Both atria are normal in size.     4. The aortic valve is trileaflet in morphology. There is no  significant aortic valve stenosis or regurgitation.     5. There is no other significant valvular disease.     6. Delayed enhancement imaging demonstrates no evidence of myocardial  infarction, fibrosis or infiltrative disease.         IMPRESSION:     Normal LV and RV size and function. Regional abnormalities of the RV  that do not meet the Task Force CMR criteria for the diagnosis of  ARVC. A comprehensive workup for arrhythmogenic cardiomyopathy should  nevertheless be pursued.     Measurements:     LV wall thickness - Anteroseptal: 0.8 cm  LV wall thickness - Inferolateral: 0.7 cm  LV end-diastolic diameter: 5.2 cm  LV end-systolic diameter: 3.3 cm  LA dara-posterior diameter: 3.0 cm  Aortic root at the sinuses of Valsalva: 3.4 cm     Sedation and contrast:     Sedation used: No  Contrast agent: Gadavist  Volume administered: 10 ml     Pulse sequences used:     SSFP Localizers  SSFP Cine  IR SSFP Single Shot  IR GRE Segmented     I have personally reviewed the examination and initial interpretation  and I agree with the findings.     TARA KHAN MD    Assessment and recommendations:    1) Arrhythmogenic right ventricular cardiomyopathy - At this point I would not recommend an ICD or further monitoring. We discussed a follow up MRI at some point in the future. She  will consider this.     We also discussed that she does not meet Task Force criteria for the diagnosis, though her cardiac MR is very suggestive.    2) Paroxysmal atrial fibrillation - CHADS-Vasc score is 1 (gender).     We discussed that at this time she does not require oral anticoagulation.     If her episodes become symptomatic enough treatment of her atrial fibrillation for symptom relief will be discussed further.     I appreciate the chance to help with Mrs. Clemons's care. Please let me know if you have any questions or concerns.    Jw Barlow MD

## 2017-11-06 NOTE — PROGRESS NOTES
CC - arrhythmogenic right ventricular cardiomyopathy, atrial fibrillation     HPI:Our entire twenty-minute visit was spent reviewing her MRI, discussing further testing and risk of sudden cardiac death.    We reviewed that cardiac MR does not provide a definitive diagnosis but was suggestive of arrhythmogenic right ventricular cardiomyopathy.     We reviewed activity restrictions.     Lab data:        Examination:   MR CARDIAC W CONTRAST   Date:  5/26/2017 9:59 AM  Indication:  Family history of ischemic heart disease and other  diseases of the circulatory system  Comparison: None     Cardiac MRI with and without contrast was performed on a 1.5 T scanner  to evaluate myocardial morphology, function and viability, and to  assess for arrhythmogenic cardiomyopathy in a 51-year-old female with  paroxysmal atrial fibrillation and atrial flutter status post ablation  with family history of sudden death and possible left dominant  arrhythmogenic cardiomyopathy in a son.     1. The left ventricle is normal in cavity size and wall thickness. The  global systolic function is normal. The quantitative LV ejection  fraction is 57%. There are no regional wall motion abnormalities.      LV volumes absolute and indexed to BSA with age and gender-matched  normal values in parentheses (mean, 95% CI) are listed below:     EDV: 159 ml (126,  mL)  EDVI: 68 ml/m2 (79, 62-97 mL/m2)  ESV: 68 ml (51,29-74 mL)  ESVI: 36 ml/m2  (26, 15-37 mL/m2)     2. The right ventricle is normal in end-diastolic and end-systolic  volumes. The global systolic function is normal. The quantitative RV  ejection fraction is 60%. There is hypokinesis of the apical third of  the right ventricular free wall. There appears to be one small area of  dyskinesia in the RV free wall. In the presence of normal RV size and  function, these regional abnormalities do not satisfy the Task Force  CMR criteria for the diagnosis of ARVC.     RV volumes absolute and  indexed to BSA with age and gender-matched  normal values in parentheses (mean, 95% CI) are listed below:     EDV: 162 ml (124,  mL)  EDVI: 86 ml/m2 (72, 53-90 mL/m2)  ESV: 65 ml (42, 15-68 mL)  ESVI: 35 ml/m2  (24, 11-37 mL/m2)     3. Both atria are normal in size.     4. The aortic valve is trileaflet in morphology. There is no  significant aortic valve stenosis or regurgitation.     5. There is no other significant valvular disease.     6. Delayed enhancement imaging demonstrates no evidence of myocardial  infarction, fibrosis or infiltrative disease.         IMPRESSION:     Normal LV and RV size and function. Regional abnormalities of the RV  that do not meet the Task Force CMR criteria for the diagnosis of  ARVC. A comprehensive workup for arrhythmogenic cardiomyopathy should  nevertheless be pursued.     Measurements:     LV wall thickness - Anteroseptal: 0.8 cm  LV wall thickness - Inferolateral: 0.7 cm  LV end-diastolic diameter: 5.2 cm  LV end-systolic diameter: 3.3 cm  LA dara-posterior diameter: 3.0 cm  Aortic root at the sinuses of Valsalva: 3.4 cm     Sedation and contrast:     Sedation used: No  Contrast agent: Gadavist  Volume administered: 10 ml     Pulse sequences used:     SSFP Localizers  SSFP Cine  IR SSFP Single Shot  IR GRE Segmented     I have personally reviewed the examination and initial interpretation  and I agree with the findings.     TARA KHAN MD    Assessment and recommendations:    1) Arrhythmogenic right ventricular cardiomyopathy - At this point I would not recommend an ICD or further monitoring. We discussed a follow up MRI at some point in the future. She will consider this.     We also discussed that she does not meet Task Force criteria for the diagnosis, though her cardiac MR is very suggestive.    2) Paroxysmal atrial fibrillation - CHADS-Vasc score is 1 (gender).     We discussed that at this time she does not require oral anticoagulation.     If her episodes  become symptomatic enough treatment of her atrial fibrillation for symptom relief will be discussed further.     I appreciate the chance to help with Mrs. Clemons's care. Please let me know if you have any questions or concerns.    Jw Barlow MD

## 2017-12-11 NOTE — PROGRESS NOTES
HPI:     PAST MEDICAL HISTORY:  Past Medical History:   Diagnosis Date     ADD (attention deficit disorder)      Atrial flutter (H)     ablation at [facility] 2013     Depression      Esophageal reflux      Other forms of migraine, without mention of intractable migraine without mention of status migrainosus      Paroxysmal atrial fibrillation (H)      Paroxysmal supraventricular tachycardia (H)      Unspecified asthma(493.90)        FAMILY HISTORY:  Family History   Problem Relation Age of Onset     Arrhythmia Mother      takes Metoprol      CANCER Father      lung-smoker     Myocardial Infarction Paternal Grandfather      CANCER Brother      jsut passed, unsure if it was heart related     Bronchitis Brother        SOCIAL HISTORY:  Social History     Social History     Marital status:      Spouse name: N/A     Number of children: N/A     Years of education: N/A     Social History Main Topics     Smoking status: Never Smoker     Smokeless tobacco: Not on file     Alcohol use Yes      Comment: 3-5 per week     Drug use: No     Sexual activity: Yes     Partners: Male     Birth control/ protection: Surgical      Comment: vasectomy     Other Topics Concern     Parent/Sibling W/ Cabg, Mi Or Angioplasty Before 65f 55m? No     Caffeine Concern No     2 cups of tea a day , occ Monster drink     Sleep Concern Yes     CPAP every night     Stress Concern No     Weight Concern No     Special Diet No     Exercise No     Seat Belt Yes     Social History Narrative       CURRENT MEDICATIONS:  reviewed    ROS:   Constitutional: No fever, chills, or sweats. No weight gain/loss.   ENT: No visual disturbance, ear ache, epistaxis, sore throat.   Allergies/Immunologic: Negative.   Respiratory: No cough, hemoptysis.   Cardiovascular: As per HPI.   GI: No nausea, vomiting, hematemesis, melena, or hematochezia.   : No urinary frequency, dysuria, or hematuria.   Integument: Negative.   Psychiatric: Negative.   Neuro: Negative.  "  Endocrinology: Negative.   Musculoskeletal: Negative.    EXAM:  BP (!) 145/95 (BP Location: Right arm, Patient Position: Chair, Cuff Size: Adult Regular)  Pulse 66  Ht 1.727 m (5' 8\")  Wt 76.2 kg (168 lb)  SpO2 98%  BMI 25.54 kg/m2  General: appears comfortable, alert and articulate  Head: normocephalic, atraumatic  Eyes: anicteric sclera, EOMI  Neck: no adenopathy  Orophyarynx: moist mucosa, no lesions, dentition intact  Heart: regular, S1/S2, no murmur, gallop, rub, estimated JVP <10cm  Lungs: clear, no rales or wheezing  Abdomen: soft, non-tender, bowel sounds present, no hepatomegaly  Extremities: no clubbing, cyanosis or edema  Neurological: normal speech and affect, no gross motor deficits    Labs:  CBC RESULTS:  Lab Results   Component Value Date    HGB 13.9 07/15/2003       CMP RESULTS:  Lab Results   Component Value Date     07/15/2003    POTASSIUM 4.1 07/15/2003    CHLORIDE 109 07/15/2003    CO2 27 07/15/2003    ANIONGAP 2 (L) 07/15/2003    GLC 85 07/15/2003    BUN 12 07/15/2003    CR 0.8 07/15/2003    TONY 9.0 07/15/2003    ALT 26 07/15/2003        Assessment and Plan: Diagnosis:  Family History of Possible LDAC     Results:  The results of your MRI and Holter were discussed with you today.     Recommendations:    1.  Continue to eat a heart healthy, low salt diet.  2.  Please follow the exercise restrictions outlined by Dr. Sorenson - please refer to the sheet provided.        -Must be allowed to speak 5-7 word sentences while exercising.   3.  Continue to observe good oral hygiene, with regular dental visits.  4.  Please stop your Flecainide.  5. Please start Metoprolol XL 50 mg daily.      Madison Sorenson MD  Section Head - Advanced Heart Failure, Transplantation and Mechanical Circulatory Support  Co-Director - [facility]  Associate Professor of Medicine, [facility]    "

## 2018-07-25 DIAGNOSIS — I48.0 PAROXYSMAL ATRIAL FIBRILLATION (H): ICD-10-CM

## 2018-07-25 DIAGNOSIS — I42.8 ARRHYTHMOGENIC LEFT VENTRICULAR DYSPLASIA (H): ICD-10-CM

## 2018-07-26 RX ORDER — METOPROLOL SUCCINATE 50 MG/1
TABLET, EXTENDED RELEASE ORAL
Qty: 90 TABLET | Refills: 3 | Status: SHIPPED | OUTPATIENT
Start: 2018-07-26 | End: 2020-05-22

## 2019-03-15 ENCOUNTER — HOSPITAL ENCOUNTER (OUTPATIENT)
Dept: MAMMOGRAPHY | Facility: CLINIC | Age: 54
Discharge: HOME | End: 2019-03-15
Attending: PHYSICIAN ASSISTANT | Admitting: PHYSICIAN ASSISTANT
Payer: COMMERCIAL

## 2019-03-15 DIAGNOSIS — Z12.31 VISIT FOR SCREENING MAMMOGRAM: ICD-10-CM

## 2019-03-15 PROCEDURE — 77067 SCR MAMMO BI INCL CAD: CPT

## 2019-06-18 ENCOUNTER — APPOINTMENT (OUTPATIENT)
Age: 54
Setting detail: DERMATOLOGY
End: 2019-06-18

## 2019-06-18 DIAGNOSIS — Z41.9 ENCOUNTER FOR PROCEDURE FOR PURPOSES OTHER THAN REMEDYING HEALTH STATE, UNSPECIFIED: ICD-10-CM

## 2019-06-18 PROCEDURE — OTHER BOTOX: OTHER

## 2019-06-18 NOTE — PROCEDURE: BOTOX
Price (Use Numbers Only, No Special Characters Or $): 610 Price (Use Numbers Only, No Special Characters Or $): 704

## 2019-06-18 NOTE — PROCEDURE: BOTOX
Consent: Treatment performed today: BOTOX\\n\\nPositive Hx of: \\n\\nNegative/denies Hx of: Sensitivity/Allergies to: albumin, Gram + bacteria proteins, or anesthetics such as Lidocaine; immune compromised; hypersensitivity reactions; autoimmune disorders; neurological disorders; currently pregnant/breast feeding; recent h/o infections, oral or mucosal symptoms/injections, or dental procedures, recent or planned vaccine(s).\\n\\nTreatment areas reviewed with the patient while holding a hand held mirror.\\nDiscussed the anatomy and anatomical changes of the face associated with and due to the chronological aging process.\\nPatient had realistic expectations. \\n\\nPRE-EXISTING ASYMMETRIES & CONSIDERATIONS TO NOTE: \\n\\nDiscussed the mechanism of action for neuromodulators (Botox/Dysport), the anatomy involved, and possible side effects that include but are not limited to: bruising, swelling, flu-like symptoms, headache, nausea, redness, tingling.  Potential complications include, but are not limited to: allergic reaction, asymmetry, blurred or double vision, infection, loss of muscle tone, ptosis, muscle weakness. \\Elaina questions answered, patient verbalized understanding and an informed consent was signed. \\n\\nTotal Units: \\nDilution: 1:1\\nSee injection diagram for dosage/placement and Botox lot # and expiration date. \\n\\nPatient informed that treatment area(s): *** are considered “off label,” non-FDA approved, and the patient verbalized understanding.\\n\\nPatient instructed not to lie down for 4 hours and for the next 24 hours to limit physical activity to avoid rubbing/massaging the treated areas, against receiving a facial, massage or seeing a chiropractor and washing the face is okay, but to wash with fingertips (no Clarisonic brush, washcloth, etc...).\\n\\nExplained to achieve optimal results and correction combo treatments and, or additional treatments/products (i.e. neurotoxin, dermal fillers, Sculptra, laser/light-based/skin tightening, body contouring/fat reduction, facials, chemical peels, skin pen, skin care) may be necessary following the completion of initial treatment recommendations and for maintenance. \\n\\nCONSIDER: \\n\\nSKIN CARE:\\n\\nRTC in 2 weeks if additional softening is needed.\\nRTC in 3 months to refresh Botox.\\n\\nCOLOR KEY FOR DIAGRAM BELOW:\\nRED = 1/2 unit of Botox\\nORANGE = 1 unit of Botox\\nYELLOW = 2 units of Botox\\nGREEN = 3 units of Botox \\nBLUE = 4 units of Botox\\nBROWN = 5 units of Botox \\nBLACK = 6 units of Botox

## 2019-07-02 ENCOUNTER — APPOINTMENT (OUTPATIENT)
Age: 54
Setting detail: DERMATOLOGY
End: 2019-07-02

## 2019-07-02 DIAGNOSIS — Z41.9 ENCOUNTER FOR PROCEDURE FOR PURPOSES OTHER THAN REMEDYING HEALTH STATE, UNSPECIFIED: ICD-10-CM

## 2019-07-02 PROCEDURE — OTHER FILLERS: OTHER

## 2019-07-02 NOTE — PROCEDURE: FILLERS
Consent: Treatment performed today:  HA Dermal Filler(s)\\n\\nPositive Hx of: \\n\\nNegative Hx/Denies: Gram + bacteria proteins, or anesthetics such as Lidocaine; HSV (cold sores), immune compromised; hypersensitivity reactions; autoimmune disorders; neurological disorders; currently pregnant/breast feeding; recent h/o infections, oral or mucosal symptoms/injections, or dental procedures, recent or planned vaccine(s).\\n\\nTreatment areas reviewed with the patient while holding a hand held mirror.\\nDiscussed the anatomy and anatomical changes of the face associated with and due to the chronological aging process.\\nPatient had realistic expectations.\\n\\nPRE-EXISTING ASYMMETRIES & CONSIDERATIONS TO NOTE:\\n\\nDiscussed the mechanism of action for HA dermal fillers, the anatomy involved, and possible side effects that include but are not limited to: redness and, or bruising, swelling, firmness, temporary numbness, tingling. Potential complications include, but are not limited to: allergic reaction, asymmetry, blindness, infection, lumpiness, necrosis, palpation of product, nodules, granulomas, hematoma, muscle weakness (from Lidocaine), scars, Cristobal effect, vascular event, and if lips are treated difficulty speaking.  \\Elaina questions answered, patient verbalized understanding and an informed consent was signed. \\n\\nPhotos taken. \\nSkin prepped with alcohol & chlorexidine, and treatment was performed. \\nSee Injection Record for the type of filler(s) administered, lot and expiration dates. \\n\\nPost-Care Instructions were discussed.   \\nRecommended gentle application of ice if needed.   Avoid strenuous activity for 48 hours, walking is okay.    Instructed not to massage, rub, or press on the treatment areas for 48 hours.  Patient was encouraged to call with any questions and, or if bruising or pain seems abnormal or worsens.   \\n\\nExplained to achieve optimal results and correction combo treatments and, or additional treatments/products (i.e. neurotoxin, dermal fillers, Sculptra, laser/light-based/skin tightening, body contouring/fat reduction, facials, chemical peels, skin pen, skin care) may be necessary following the completion of initial treatment recommendations and for maintenance.\\n\\nCONSIDER: \\n\\nSKIN CARE: \\n\\nRTC in 2 weeks, or prn.

## 2019-07-03 ENCOUNTER — APPOINTMENT (OUTPATIENT)
Age: 54
Setting detail: DERMATOLOGY
End: 2019-07-03

## 2019-07-03 DIAGNOSIS — Z41.9 ENCOUNTER FOR PROCEDURE FOR PURPOSES OTHER THAN REMEDYING HEALTH STATE, UNSPECIFIED: ICD-10-CM

## 2019-07-03 PROCEDURE — OTHER PULSED-DYE LASER: OTHER

## 2019-07-03 NOTE — PROCEDURE: PULSED-DYE LASER
Pulse Duration: 10 ms
Spot Size: 7 mm
Cryogen Time (Ms): 30
Delay Time (Ms): 20
Spot Size: 5 mm
Laser Type: Vbeam 595nm
Post-Procedure Care: TREATMENT PERFORMED TODAY - Pulse Dye Vbeam \\n\\nPositive Hx: none. \\n\\nNegative Hx / Denies: having a tan/sunburn today, autoimmune disorders; does not have a pacemaker / defibrillator; is not currently pregnant/breast feeding; HSV; recent h/o infections. \\n\\nPulse dye V-beam is a laser that treats red imperfections of the skin.\\nFor optimal results a series of 1-3 treatments, 3-4 weeks apart was recommended.\\nDiscussed pulse dye V beam, how it works, its benefits and what to expect during the treatment.\\nAdvised to avoid sun exposure and sunless tanners 2-4 weeks prior & advised on SPF / sunscreen post treatment.\\nImmediately after treatment the treated area may feel red, itch and swelling may occur.\\nNormal/expected healing process discussed: temporary and expected *bruising*, redness and or darkening of blood vessels, swelling / welting of skin x 0-5 days with possible bruising x 7-14 days.\\nOther Side Effects / Complications discussed: itching, scabbing/crusting of the skin, changes in pigment/scarring, blisters.\\n\\nPatient verbalized understanding of today’s consultation.\\nPatient had realistic expectations.\\Elaina questions answered, patient verbalized understanding and an informed consent was signed.\\n\\nTreatment areas were cleansed.\\nProtective eye wear provided and worn.\\nTreated using the parameters below and documented on treatment diagram.\\n\\nV-Beam SETTINGS: 10ms  8J (forehead/cheeks/chin)  & 9J nose with 7 mm spot size. \\n\\nPatient tolerated the procedure well.\\nPhyto applied and Ice packs were provided.\\n\\nPatient instructed to wear SPF qd.\\nPost treatment advised on gentle skin care as the treated area heals, instructed not to exfoliate, that make-up may be worn and stressed the importance of a daily SPF (30 or higher).\\nExplained Vbeam and treatment of redness / broken blood vessels usually requires a series of 1-3 treatments (plus or minus) 3-4 weeks apart.\\n\\nCONSIDER: \\n\\nMedical Grade SKIN CARE also discussed:  \\n\\nRTC in 3-4 weeks for next treatment.
Detail Level: Zone
Pulse Duration: 6 ms

## 2019-07-16 ENCOUNTER — APPOINTMENT (OUTPATIENT)
Age: 54
Setting detail: DERMATOLOGY
End: 2019-07-16

## 2019-07-16 DIAGNOSIS — Z41.9 ENCOUNTER FOR PROCEDURE FOR PURPOSES OTHER THAN REMEDYING HEALTH STATE, UNSPECIFIED: ICD-10-CM

## 2019-07-16 PROCEDURE — OTHER FILLERS: OTHER

## 2019-07-21 DIAGNOSIS — I42.8 ARRHYTHMOGENIC LEFT VENTRICULAR DYSPLASIA (H): Primary | ICD-10-CM

## 2019-07-23 NOTE — TELEPHONE ENCOUNTER
metoprolol succinate (TOPROL-XL) 50 MG   Last Written Prescription Date:  7/26/18  Last Fill Quantity: 90,   # refills: 3  Last Office Visit : 10/30/17  Future Office visit:  NONE    Routing refill request to provider for review/approval because:  OVER DUE APPT. > 18 MOS  90 DAY RF PENDING   Scheduling has been notified to contact the pt for appointment

## 2019-07-26 RX ORDER — METOPROLOL SUCCINATE 50 MG/1
TABLET, EXTENDED RELEASE ORAL
Qty: 90 TABLET | Refills: 0 | Status: SHIPPED | OUTPATIENT
Start: 2019-07-26 | End: 2019-10-23

## 2019-10-23 DIAGNOSIS — I42.8 ARRHYTHMOGENIC LEFT VENTRICULAR DYSPLASIA (H): ICD-10-CM

## 2019-10-25 NOTE — TELEPHONE ENCOUNTER
metoprolol succinate ER 50 MG 24 hr   Last Written Prescription Date:  7/26/19  Last Fill Quantity: 90,   # refills: 0  Last Office Visit : 10/30/17  Future Office visit:  NONE    Routing refill request to provider for review/approval because:  REPEAT/ 30 DAY RF  PENDING   >18MOS RTC APPT   Scheduling has been notified to contact the pt for appointment.

## 2019-10-31 RX ORDER — METOPROLOL SUCCINATE 50 MG/1
50 TABLET, EXTENDED RELEASE ORAL DAILY
Qty: 30 TABLET | Refills: 3 | Status: SHIPPED | OUTPATIENT
Start: 2019-10-31 | End: 2020-01-31

## 2019-11-18 ENCOUNTER — DOCUMENTATION ONLY (OUTPATIENT)
Dept: CARE COORDINATION | Facility: CLINIC | Age: 54
End: 2019-11-18

## 2019-12-03 ENCOUNTER — APPOINTMENT (OUTPATIENT)
Age: 54
Setting detail: DERMATOLOGY
End: 2019-12-03

## 2019-12-03 DIAGNOSIS — Z41.9 ENCOUNTER FOR PROCEDURE FOR PURPOSES OTHER THAN REMEDYING HEALTH STATE, UNSPECIFIED: ICD-10-CM

## 2019-12-03 PROCEDURE — OTHER BOTOX: OTHER

## 2019-12-03 NOTE — PROCEDURE: BOTOX
Price (Use Numbers Only, No Special Characters Or $): 326 Price (Use Numbers Only, No Special Characters Or $): 129

## 2019-12-03 NOTE — PROCEDURE: BOTOX
Consent: ***DOCUMENTED ON PAPER _ SEE SCANNED DOCUMENTS\\n\\nBOTOX  - Treatment Performed Today.  \\n\\nPositive Hx: none. \\n\\nNegative Hx / Denies: sensitivity/Allergies to: albumin, Gram + bacteria proteins, or anesthetics such as Lidocaine, cold sores, autoimmune disease/disorder, neurological disorders,  immune compromised; hypersensitivity reaction; currently pregnant/breast feeding; recent h/o infections, oral or mucosal symptoms/injections, or dental procedures. \\n\\nTreatment areas reviewed with the patient while holding a hand held mirror.\\nDiscussed the anatomy and anatomical changes of the face associated with and due to the chronological aging process.\\nPatient had realistic expectations. \\nFACE ASSESSMENT with CALIPERS performed today. \\n \\nPRE-EXISTING ASYMMETRIES & CONSIDERATIONS TO NOTE:   Skin Type II   \\nDiscussed the mechanism of action for neuromodulators (Botox/Dysport), the anatomy involved, and possible side effects that include but are not limited to: bruising, swelling, flu-like symptoms, headache, nausea, redness, tingling.  Potential complications include, but are not limited to: allergic reaction, asymmetry, blurred or double vision, infection, loss of muscle tone, ptosis, muscle weakness. \\Elaina questions answered, patient verbalized understanding and an informed consent was signed. \\n\\nTotal Units: 49\\nDilution: 1:1\\n\\nDOCUMENTED ON PAPER - SEE SCANNED DOCUMENTS \\n\\nPatient informed that treatment to the following area(s) is considered \"off-label\" and non-FDA approved: Botox to EDITH & mentalis. \\nVerbally instructed and reminded not to lie down for 4 hours and for the next 24 hours to limit physical activity to avoid rubbing/massaging the treated areas, against receiving a facial, massage or seeing a chiropractor and washing the face is okay, but to wash with fingertips (no Clarisonic brush, washcloth, etc...).\\nExplained to achieve optimal results and correction combo treatments and, or additional treatments/products (i.e. neurotoxin, dermal fillers, skin care, etc...) may be necessary following the completion of initial treatment recommendations and for maintenance. \\n\\nCONSIDER:  \\n\\nSKIN CARE: applied  Intellishade.   \\n\\nRTC  in 2 weeks if additional softening is needed. \\nRTC in 3-4 months to refresh Botox\\n\\nBrochures Given Today:  none. \\n\\nCOLOR KEY FOR DIAGRAM BELOW:\\nRED = 1/2 unit of Botox\\nORANGE = 1 unit of Botox\\nYELLOW = 2 units of Botox\\nGREEN = 3 units of Botox \\nBLUE = 4 units of Botox\\nBROWN = 5 units of Botox \\nBLACK = * Filler *

## 2019-12-15 ENCOUNTER — HEALTH MAINTENANCE LETTER (OUTPATIENT)
Age: 54
End: 2019-12-15

## 2020-01-30 DIAGNOSIS — I42.8 ARRHYTHMOGENIC LEFT VENTRICULAR DYSPLASIA (H): ICD-10-CM

## 2020-01-31 NOTE — TELEPHONE ENCOUNTER
METOPROLOL SUCC(TOPROL)ER TABS 50MG     Last Written Prescription Date:  7/26/2018  Last Fill Quantity: 90,   # refills: 3  Last Office Visit : 10/30/2017  Future Office visit:  None    Routing refill request to provider for review/approval because:  Office Visit & Labs Due    (Oct 2017)  Referring to clinic for review.  Over 2 years since last visit.  Still seeing Pt??   Follow up Check??    Heather Hairston RN  Central Triage Red Flags/Med Refills

## 2020-02-11 ENCOUNTER — CARE COORDINATION (OUTPATIENT)
Dept: CARDIOLOGY | Facility: CLINIC | Age: 55
End: 2020-02-11

## 2020-02-11 DIAGNOSIS — I42.8 ARRHYTHMOGENIC LEFT VENTRICULAR DYSPLASIA (H): ICD-10-CM

## 2020-02-11 DIAGNOSIS — I48.0 PAROXYSMAL ATRIAL FIBRILLATION (H): Primary | ICD-10-CM

## 2020-02-11 RX ORDER — METOPROLOL SUCCINATE 50 MG/1
TABLET, EXTENDED RELEASE ORAL
Qty: 90 TABLET | Refills: 1 | Status: SHIPPED | OUTPATIENT
Start: 2020-02-11 | End: 2020-05-22

## 2020-02-11 NOTE — PROGRESS NOTES
Date: 2/11/2020    Time of Call: 9:50 AM     Diagnosis:  Family history of possible LDAC     [ TORB ] Ordering provider: Arrhythmogenic Cardiomyopathy protocol   Order: Follow up with Dr Barlow and Dr Sorenson with a cardiac MRI, 14 day Zio, fasting labs and stair test     Order received by: Tirso Cheney RN     Follow-up/additional notes: NA

## 2020-02-12 ENCOUNTER — TELEPHONE (OUTPATIENT)
Dept: CARDIOLOGY | Facility: CLINIC | Age: 55
End: 2020-02-12

## 2020-02-12 DIAGNOSIS — I42.8 ARRHYTHMOGENIC LEFT VENTRICULAR DYSPLASIA (H): ICD-10-CM

## 2020-02-12 DIAGNOSIS — I48.0 PAROXYSMAL ATRIAL FIBRILLATION (H): ICD-10-CM

## 2020-02-12 RX ORDER — METOPROLOL SUCCINATE 50 MG/1
50 TABLET, EXTENDED RELEASE ORAL DAILY
Qty: 90 TABLET | Refills: 3 | Status: SHIPPED | OUTPATIENT
Start: 2020-02-12 | End: 2020-05-22

## 2020-03-05 ENCOUNTER — TELEPHONE (OUTPATIENT)
Dept: CARDIOLOGY | Facility: CLINIC | Age: 55
End: 2020-03-05

## 2020-03-05 NOTE — TELEPHONE ENCOUNTER
Uzma left VM stating that she is scheduled to see us in May with an MRI. She forgot to mention that over the winter, she broke her wrist and has a plate and 10 screws on her left wrist.  She is wondering if this will affect the MRI.    Calback:[phone]

## 2020-03-09 NOTE — TELEPHONE ENCOUNTER
Reviewed with Dr Neff and manager with the imaging department.  Uzma's chart was reviewed and patient is ok to proceed with MRI imaging.  Called Uzma and left a voicemail with this information.  Provided call back number to discuss.    Tirso Cheney RN  Cardiology RN Care Coordinator  [phone]

## 2020-03-30 ENCOUNTER — ALLIED HEALTH/NURSE VISIT (OUTPATIENT)
Dept: CARDIOLOGY | Facility: CLINIC | Age: 55
End: 2020-03-30
Attending: INTERNAL MEDICINE
Payer: COMMERCIAL

## 2020-03-30 DIAGNOSIS — I48.0 PAROXYSMAL ATRIAL FIBRILLATION (H): ICD-10-CM

## 2020-03-30 DIAGNOSIS — I42.8 ARRHYTHMOGENIC LEFT VENTRICULAR DYSPLASIA (H): ICD-10-CM

## 2020-03-30 PROCEDURE — 0298T ZIO PATCH HOLTER ADULT PEDIATRIC GREATER THAN 48 HRS: CPT | Mod: ZP | Performed by: INTERNAL MEDICINE

## 2020-04-21 ENCOUNTER — TELEPHONE (OUTPATIENT)
Dept: CARDIOLOGY | Facility: CLINIC | Age: 55
End: 2020-04-21

## 2020-04-27 ENCOUNTER — TELEPHONE (OUTPATIENT)
Dept: CARDIOLOGY | Facility: CLINIC | Age: 55
End: 2020-04-27

## 2020-04-27 NOTE — TELEPHONE ENCOUNTER
I had received a fax from [facility] regarding this patient's Ziopatch and that it hadn't been returned yet.  I called the patient and left a voicemail regarding this and left her a callback to [phone] with any questions.    -Luba CASH MA

## 2020-05-21 PROBLEM — Q24.9 CONGENITAL ANOMALIES OF THE HEART: Status: ACTIVE | Noted: 2020-05-21

## 2020-05-22 ENCOUNTER — VIRTUAL VISIT (OUTPATIENT)
Dept: CARDIOLOGY | Facility: CLINIC | Age: 55
End: 2020-05-22
Attending: INTERNAL MEDICINE
Payer: COMMERCIAL

## 2020-05-22 DIAGNOSIS — I48.0 PAROXYSMAL ATRIAL FIBRILLATION (H): ICD-10-CM

## 2020-05-22 DIAGNOSIS — I42.8 ARRHYTHMOGENIC LEFT VENTRICULAR DYSPLASIA (H): ICD-10-CM

## 2020-05-22 PROCEDURE — 99214 OFFICE O/P EST MOD 30 MIN: CPT | Mod: 95 | Performed by: INTERNAL MEDICINE

## 2020-05-22 RX ORDER — METOPROLOL TARTRATE 50 MG
50 TABLET ORAL DAILY
Qty: 180 TABLET | Refills: 3 | Status: SHIPPED | OUTPATIENT
Start: 2020-05-22 | End: 2020-07-03

## 2020-05-22 ASSESSMENT — PAIN SCALES - GENERAL: PAINLEVEL: NO PAIN (0)

## 2020-05-22 NOTE — LETTER
5/22/2020      RE: Uzma Clemons  [address] [facility] [id number]       Dear Colleague,    Thank you for the opportunity to participate in the care of your patient, Uzma Clemons, at the [facility]ART [facility]. Please see a copy of my visit note below.    Uzma Clemons is a 54 year old female who is being evaluated via a billable video visit.        HPI:  55 yo female with likely arrhythmogenic cardiomyopathy with h/o atrial arrhythmias s/p ablation presents for ongoing evaluation and management. Pt reports that since last visit she has been fairly well.  She has some mild fatigue but otherwise is tolerating the metoprolol xl.  With her atrial fibrillation she has some palpitations and fatigue.  She feels that her atrial fibrillation burden is about the same.  She has some occasional atypical chest pain.  Chest discomfort is not with any other associated symptoms and is not consistently associated with or worsened by exertion.  She denies any sob/page, orthopnea, pnd, syncope/presyncope or cole.  She is exercising regularly, walking 2-3 miles a day and biking 30-40 min frequently all while keeping HR < 140bpm and following exercise restrictions.  She reports there have been no additional family history.    .    PAST MEDICAL HISTORY:  Past Medical History:   Diagnosis Date     ADD (attention deficit disorder)      Atrial flutter (H)     ablation at [facility] 2013     Depression      Esophageal reflux      Other forms of migraine, without mention of intractable migraine without mention of status migrainosus      Paroxysmal atrial fibrillation (H)      Paroxysmal supraventricular tachycardia (H)      Unspecified asthma(493.90)        FAMILY HISTORY:  Family History   Problem Relation Age of Onset     Arrhythmia Mother         takes Metoprol      Cancer Father         lung-smoker     Myocardial Infarction Paternal Grandfather      Cancer Brother   "       jsut passed, unsure if it was heart related     Bronchitis Brother    A four generation family history was obtained at office visit on 2017.  (Please see scanned pedigree for details).  Family history was significant for the following: Татьяна's son experienced chest pains, back tightness, and fever in 2017.  MRI lists the primary differential diagnosis is viral myocarditis versus a\" hot phase\" of left dominant arrhythmogenic cardiomyopathy (LDAC.) Татьяна's brother  suddenly at 49 years of age.  Autopsy revealed cardiomegaly and moderate fat investment on heart surface.  Another brother  at 54 years.  Autopsy, not yet reviewed by us, stated brain aneurysm as cause of death.  They are unaware of any heart findings.  A third brother has LV hypertrophy, reduced LVEF, and some LA enlargement. Татьяна's mother also has a history of arrhythmia since 63 years.  She has reported palpitations and dizziness. Little information is known about Татьяна's father.  Although he  around 55 years from suspected cancer.  There is no additional history of cardiomyopathy, arrythmias, heart attacks, fainting, sudden cardiac death, genetic conditions, or birth defects.      SOCIAL HISTORY:  Social History     Social History     Marital status:      Spouse name: N/A     Number of children: N/A     Years of education: N/A     Social History Main Topics     Smoking status: Never Smoker     Smokeless tobacco: Not on file     Alcohol use Yes      Comment: 3-5 per week     Drug use: No     Sexual activity: Yes     Partners: Male     Birth control/ protection: Surgical      Comment: vasectomy     Other Topics Concern     Parent/Sibling W/ Cabg, Mi Or Angioplasty Before 65f 55m? No     Caffeine Concern No     2 cups of tea a day , occ Monster drink     Sleep Concern Yes     CPAP every night     Stress Concern No     Weight Concern No     Special Diet No     Exercise No     Seat Belt Yes     Social History " Narrative       CURRENT MEDICATIONS:  Metoprolol xl 50mg daily  Cholecalciferol (VITAMIN D) 1000 UNITS capsule, Take 1 capsule by mouth daily  order for DME, CPAP every night  venlafaxine (EFFEXOR XR) 37.5 MG 24 hr capsule, Take 37.5 mg by mouth daily  ZOMIG ZMT 2.5 MG OR TBDP, 1 TAB AT THE START OF THE ATTACK, THEN AS DIRECT    No current facility-administered medications on file prior to visit.         ROS:   Constitutional: No fever, chills, or sweats. No weight gain/loss.   ENT: No visual disturbance, ear ache, epistaxis, sore throat.   Allergies/Immunologic: Negative.   Respiratory: No cough, hemoptysis.   Cardiovascular: As per HPI.   GI: No nausea, vomiting, hematemesis, melena, or hematochezia.   : No urinary frequency, dysuria, or hematuria.   Integument: Negative.   Psychiatric: Negative.   Neuro: Negative.   Endocrinology: Negative.   Musculoskeletal: Negative.    EXAM:  There were no vitals taken for this visit. given virtual visit  Constitutional - WDWN, no acute distress   Eyes - no redness or discharge, nonicteric sclera  Respiratory - nonlabored breathing, able to speak in full sentences without difficulty  CV: no visible edema of visualized upper extremities.  Neurological - alert and oriented, speech fluent/appropriate  PSYCH: cooperative, affect appropriate  DERM: no rashes on visualized face/neck/upper extremities     The rest of a comprehensive physical examination is deferred due to PHE (public health emergency) video visit restrictions      Cardiac studies  Zio monitor may 2017       CARDIAC W CONTRAST 5/26/2017   Indication:  Family history of ischemic heart disease and other  diseases of the circulatory system  Cardiac MRI with and without contrast was performed on a 1.5 T scanner  to evaluate myocardial morphology, function and viability, and to  assess for arrhythmogenic cardiomyopathy in a 51-year-old female with  paroxysmal atrial fibrillation and atrial flutter status post  ablation  with family history of sudden death and possible left dominant  arrhythmogenic cardiomyopathy in a son.  1. The left ventricle is normal in cavity size and wall thickness. The  global systolic function is normal. The quantitative LV ejection  fraction is 57%. There are no regional wall motion abnormalities.   LV volumes absolute and indexed to BSA with age and gender-matched  normal values in parentheses (mean, 95% CI) are listed below:  EDV: 159 ml (126,  mL)  EDVI: 68 ml/m2 (79, 62-97 mL/m2)  ESV: 68 ml (51,29-74 mL)  ESVI: 36 ml/m2  (26, 15-37 mL/m2)  2. The right ventricle is normal in end-diastolic and end-systolic  volumes. The global systolic function is normal. The quantitative RV  ejection fraction is 60%. There is hypokinesis of the apical third of  the right ventricular free wall. There appears to be one small area of  dyskinesia in the RV free wall. In the presence of normal RV size and  function, these regional abnormalities do not satisfy the Task Force  CMR criteria for the diagnosis of ARVC.  RV volumes absolute and indexed to BSA with age and gender-matched  normal values in parentheses (mean, 95% CI) are listed below:  EDV: 162 ml (124,  mL)  EDVI: 86 ml/m2 (72, 53-90 mL/m2)  ESV: 65 ml (42, 15-68 mL)  ESVI: 35 ml/m2  (24, 11-37 mL/m2)  3. Both atria are normal in size.  4. The aortic valve is trileaflet in morphology. There is no  significant aortic valve stenosis or regurgitation.  5. There is no other significant valvular disease.  6. Delayed enhancement imaging demonstrates no evidence of myocardial  infarction, fibrosis or infiltrative disease.  IMPRESSION:  Normal LV and RV size and function. Regional abnormalities of the RV  that do not meet the Task Force CMR criteria for the diagnosis of  ARVC. A comprehensive workup for arrhythmogenic cardiomyopathy should  nevertheless be pursued.    Zio Monitor April 2020      Assessment and Plan: Diagnosis:  53 yo female with  likely arrhythmogenic cardiomyopathy with h/o atrial arrhythmias s/p ablation presents for ongoing evaluation and management.    1.  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  Will have Uzma increase her metoprolol xl to 50mg qam and 25mg qpm.  Will have patient obtain a repeat cardiac MRI and cardiopulmonary stress test.  Have previously Had extensive discussion with patient about pathophysiology of arrhythmogenic cardiomyopathy.  Reviewed with patient recommendations regarding exercise safety with arrhythmogenic cardiomyopathy.  Recommended that patient be able to comfortably speak 5-7 words at peak exercise and keep HR < 85% max predicted HR, thus for patient < 140 bpm, during exercise.  Also discussed with patient the need to avoid all stimulants including Sudafed and all ephedrine containing products as well as methamphetamines and cocaine and limit alcohol intake to less than 3-4 drinks per week..  Pt again reminded that all first degree relatives should be screened for arrhythmogenic cardiomyopathy with cardiac MRI, EKG and Holter/Zio monitor. Pt educated that any syncopal episode is considered a medical emergency requiring ER visit/evaluaion.  Pt's son has upcoming appt with our genetic counselor to further discuss the option of genetic testing.     Follow-up:  Obtain labs in next few weeks.  Will repeat cardiac MRI and CPX and have in person visit with me and Dr. Barlow on Friday July 3rd.  Will be happy to see sooner if change in clinical status or new questions/concerns arise.      Madison Sorenson MD  Section Head - Advanced Heart Failure, Transplantation and Mechanical Circulatory Support  Director - Adult Congenital and Cardiovascular Genetics Center  Associate Professor of Medicine, [facility]      Video-Visit Details  Type of service:  Video Visit  Video Start Time: 13:30  Video End Time (time video stopped):  13:49  Originating Location (pt. Location): Home  Distant Location (provider location):   HEALTH HEART CARE   Mode of Communication:  Video Conference via [employer]         Please do not hesitate to contact me if you have any questions/concerns.     Sincerely,     Madison Sorenson MD

## 2020-05-22 NOTE — PATIENT INSTRUCTIONS
You were seen today in the Adult Congenital and Cardiovascular Genetics Clinic at the [facility].    Cardiology Providers you saw during your visit:  Dr Madison Sorenson    Diagnosis:  Arrthymogenic Right Venticular Cardiomyopathy     Results:  Dr Sorenson reviewed the results of your Zio monitor with you today in clinic    Recommendations:    1.  Continue to eat a heart healthy, low salt diet.  2.  Continue to get 20-30 minutes of aerobic activity, 4-5 days per week.  Examples of aerobic activity include walking, running, swimming, cycling, etc.  3.  Continue to observe good oral hygiene, with regular dental visits.  4.  Increase your Metoprolol to 50 mg in the AM and 25 mg in the PM  5.  Please have fasting labs drawn at [facility]- nothing to eat or drink 6-8 hours prior except for water with your medications  6.  For the cardiac MRI- nothing to eat or drink 3 hours prior.  No caffeine, alcohol, or tobacco 12 hours prior.  You may take your medications that morning with sips of water.  7.  For the cardiopulmonary stress test.  No caffeine, alcohol, or tobacco 12 hours prior.  You may take your medications that morning with sips of water.  8.  Refrain from the use of stimulants. stimulants, limit alcohol intake to  3-4 drinks/week  9.  Report to the ER for any syncopal event.    SBE prophylaxis:   Yes____  No__x_    Lifelong Bacterial Endocarditis Prophylaxis:  YES____  NO____    If YES is checked, follow the recommendations outlined below:   1. Take antibiotic(s) prior to recommended dental procedures and procedures on the respiratory tract or with infected skin, muscle or bones. SBE prophylaxis is not needed for routine GI and  procedures (ie. Colonoscopy or vaginal delivery)   2. Observe good oral hygiene daily, as advised by your dentist. Get regular professional dental care.   3. Keep cuts clean.   4. Infections should be treated promptly.   5. Symptoms of Infective Endocarditis could include:  fever lasting more than 4-5 days or a recurrent fever that initially resolves but returns within 1-2 days)     Exercise restrictions:   Yes__x__  No____         If yes, list restrictions:  Must be allowed to rest if fatigued or SOB    Restrict from endurance or competitive athletics  2. Should be able to comfortably speak 5-7 words during all times with exercise  3. Should keep HR <85% predicted maximum HR- 140 BPM      Work restrictions:  Yes____  No__x__         If yes, list restrictions:    FASTING CHOLESTEROL was checked in the last 5 years YES___  NO_x__   Continue to eat a heart healthy, low salt diet.         ____ Fasting lipid panel order today         ____ No changes in medications          ____ I recommend the following changes in your cholesterol medications.:          ____ Please follow up for cholesterol screening at your primary care physician      Follow-up:  Follow up with Dr Sorenson and Dr Barlow on Friday July 3rd with your imaging.       For after hours urgent needs, call [phone] and ask to speak to the Adult Congenital Physician on call.  Mention Job Code [id number].    For emergencies call [phone].    For any scheduling needs, please call [facility] , at [phone]  Thank you for your visit today!  If you have questions or concerns about today's visit, please call me.    Tirso Cheney, RN, BSN  Cardiology Care Coordinator  [facility]  [phone]    [address] [facility]  Mail Code [id number]  [place] [address]

## 2020-05-22 NOTE — PROGRESS NOTES
"Uzma Clemons is a 54 year old female who is being evaluated via a billable video visit.      The patient has been notified of following:     \"This video visit will be conducted via a call between you and your physician/provider. We have found that certain health care needs can be provided without the need for an in-person physical exam.  This service lets us provide the care you need with a video conversation.  If a prescription is necessary we can send it directly to your pharmacy.  If lab work is needed we can place an order for that and you can then stop by our lab to have the test done at a later time.    Video visits are billed at different rates depending on your insurance coverage.  Please reach out to your insurance provider with any questions.    If during the course of the call the physician/provider feels a video visit is not appropriate, you will not be charged for this service.\"    Patient has given verbal consent for Video visit? Yes    How would you like to obtain your AVS? Nela    Patient would like the video invitation sent by: Send to e-mail at: [email or web address]      HPI:  55 yo female with likely arrhythmogenic cardiomyopathy with h/o atrial arrhythmias s/p ablation presents for ongoing evaluation and management. Pt reports that since last visit she has been fairly well.  She has some mild fatigue but otherwise is tolerating the metoprolol xl.  With her atrial fibrillation she has some palpitations and fatigue.  She feels that her atrial fibrillation burden is about the same.  She has some occasional atypical chest pain.  Chest discomfort is not with any other associated symptoms and is not consistently associated with or worsened by exertion.  She denies any sob/page, orthopnea, pnd, syncope/presyncope or cole.  She is exercising regularly, walking 2-3 miles a day and biking 30-40 min frequently all while keeping HR < 140bpm and following exercise restrictions.  She reports there have " "been no additional family history.    .    PAST MEDICAL HISTORY:  Past Medical History:   Diagnosis Date     ADD (attention deficit disorder)      Atrial flutter (H)     ablation at [facility] 2013     Depression      Esophageal reflux      Other forms of migraine, without mention of intractable migraine without mention of status migrainosus      Paroxysmal atrial fibrillation (H)      Paroxysmal supraventricular tachycardia (H)      Unspecified asthma(493.90)        FAMILY HISTORY:  Family History   Problem Relation Age of Onset     Arrhythmia Mother         takes Metoprol      Cancer Father         lung-smoker     Myocardial Infarction Paternal Grandfather      Cancer Brother         tish passed, unsure if it was heart related     Bronchitis Brother    A four generation family history was obtained at office visit on 2017.  (Please see scanned pedigree for details).  Family history was significant for the following: Татьяна's son experienced chest pains, back tightness, and fever in 2017.  MRI lists the primary differential diagnosis is viral myocarditis versus a\" hot phase\" of left dominant arrhythmogenic cardiomyopathy (LDAC.) Татьяна's brother  suddenly at 49 years of age.  Autopsy revealed cardiomegaly and moderate fat investment on heart surface.  Another brother  at 54 years.  Autopsy, not yet reviewed by us, stated brain aneurysm as cause of death.  They are unaware of any heart findings.  A third brother has LV hypertrophy, reduced LVEF, and some LA enlargement. Татьяна's mother also has a history of arrhythmia since 63 years.  She has reported palpitations and dizziness. Little information is known about Татьяна's father.  Although he  around 55 years from suspected cancer.  There is no additional history of cardiomyopathy, arrythmias, heart attacks, fainting, sudden cardiac death, genetic conditions, or birth defects.      SOCIAL HISTORY:  Social History     Social History     " Marital status:      Spouse name: N/A     Number of children: N/A     Years of education: N/A     Social History Main Topics     Smoking status: Never Smoker     Smokeless tobacco: Not on file     Alcohol use Yes      Comment: 3-5 per week     Drug use: No     Sexual activity: Yes     Partners: Male     Birth control/ protection: Surgical      Comment: vasectomy     Other Topics Concern     Parent/Sibling W/ Cabg, Mi Or Angioplasty Before 65f 55m? No     Caffeine Concern No     2 cups of tea a day , occ Monster drink     Sleep Concern Yes     CPAP every night     Stress Concern No     Weight Concern No     Special Diet No     Exercise No     Seat Belt Yes     Social History Narrative       CURRENT MEDICATIONS:  Metoprolol xl 50mg daily  Cholecalciferol (VITAMIN D) 1000 UNITS capsule, Take 1 capsule by mouth daily  order for DME, CPAP every night  venlafaxine (EFFEXOR XR) 37.5 MG 24 hr capsule, Take 37.5 mg by mouth daily  ZOMIG ZMT 2.5 MG OR TBDP, 1 TAB AT THE START OF THE ATTACK, THEN AS DIRECT    No current facility-administered medications on file prior to visit.         ROS:   Constitutional: No fever, chills, or sweats. No weight gain/loss.   ENT: No visual disturbance, ear ache, epistaxis, sore throat.   Allergies/Immunologic: Negative.   Respiratory: No cough, hemoptysis.   Cardiovascular: As per HPI.   GI: No nausea, vomiting, hematemesis, melena, or hematochezia.   : No urinary frequency, dysuria, or hematuria.   Integument: Negative.   Psychiatric: Negative.   Neuro: Negative.   Endocrinology: Negative.   Musculoskeletal: Negative.    EXAM:  There were no vitals taken for this visit. given virtual visit  Constitutional - WDWN, no acute distress   Eyes - no redness or discharge, nonicteric sclera  Respiratory - nonlabored breathing, able to speak in full sentences without difficulty  CV: no visible edema of visualized upper extremities.  Neurological - alert and oriented, speech  fluent/appropriate  PSYCH: cooperative, affect appropriate  DERM: no rashes on visualized face/neck/upper extremities     The rest of a comprehensive physical examination is deferred due to PHE (public health emergency) video visit restrictions      Cardiac studies  Zio monitor may 2017       CARDIAC W CONTRAST 5/26/2017   Indication:  Family history of ischemic heart disease and other  diseases of the circulatory system  Cardiac MRI with and without contrast was performed on a 1.5 T scanner  to evaluate myocardial morphology, function and viability, and to  assess for arrhythmogenic cardiomyopathy in a 51-year-old female with  paroxysmal atrial fibrillation and atrial flutter status post ablation  with family history of sudden death and possible left dominant  arrhythmogenic cardiomyopathy in a son.  1. The left ventricle is normal in cavity size and wall thickness. The  global systolic function is normal. The quantitative LV ejection  fraction is 57%. There are no regional wall motion abnormalities.   LV volumes absolute and indexed to BSA with age and gender-matched  normal values in parentheses (mean, 95% CI) are listed below:  EDV: 159 ml (126,  mL)  EDVI: 68 ml/m2 (79, 62-97 mL/m2)  ESV: 68 ml (51,29-74 mL)  ESVI: 36 ml/m2  (26, 15-37 mL/m2)  2. The right ventricle is normal in end-diastolic and end-systolic  volumes. The global systolic function is normal. The quantitative RV  ejection fraction is 60%. There is hypokinesis of the apical third of  the right ventricular free wall. There appears to be one small area of  dyskinesia in the RV free wall. In the presence of normal RV size and  function, these regional abnormalities do not satisfy the Task Force  CMR criteria for the diagnosis of ARVC.  RV volumes absolute and indexed to BSA with age and gender-matched  normal values in parentheses (mean, 95% CI) are listed below:  EDV: 162 ml (124,  mL)  EDVI: 86 ml/m2 (72, 53-90 mL/m2)  ESV: 65 ml (42,  15-68 mL)  ESVI: 35 ml/m2  (24, 11-37 mL/m2)  3. Both atria are normal in size.  4. The aortic valve is trileaflet in morphology. There is no  significant aortic valve stenosis or regurgitation.  5. There is no other significant valvular disease.  6. Delayed enhancement imaging demonstrates no evidence of myocardial  infarction, fibrosis or infiltrative disease.  IMPRESSION:  Normal LV and RV size and function. Regional abnormalities of the RV  that do not meet the Task Force CMR criteria for the diagnosis of  ARVC. A comprehensive workup for arrhythmogenic cardiomyopathy should  nevertheless be pursued.    Zio Monitor April 2020      Assessment and Plan: Diagnosis:  55 yo female with likely arrhythmogenic cardiomyopathy with h/o atrial arrhythmias s/p ablation presents for ongoing evaluation and management.    1.  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  Will have Uzma increase her metoprolol xl to 50mg qam and 25mg qpm.  Will have patient obtain a repeat cardiac MRI and cardiopulmonary stress test.  Have previously Had extensive discussion with patient about pathophysiology of arrhythmogenic cardiomyopathy.  Reviewed with patient recommendations regarding exercise safety with arrhythmogenic cardiomyopathy.  Recommended that patient be able to comfortably speak 5-7 words at peak exercise and keep HR < 85% max predicted HR, thus for patient < 140 bpm, during exercise.  Also discussed with patient the need to avoid all stimulants including Sudafed and all ephedrine containing products as well as methamphetamines and cocaine and limit alcohol intake to less than 3-4 drinks per week..  Pt again reminded that all first degree relatives should be screened for arrhythmogenic cardiomyopathy with cardiac MRI, EKG and Holter/Zio monitor. Pt educated that any syncopal episode is considered a medical emergency requiring ER visit/evaluaion.  Pt's son  has upcoming appt with our genetic counselor to further discuss the option of genetic testing.     Follow-up:  Obtain labs in next few weeks.  Will repeat cardiac MRI and CPX and have in person visit with me and Dr. Barlow on Friday July 3rd.  Will be happy to see sooner if change in clinical status or new questions/concerns arise.      Madison Sorenson MD  Section Head - Advanced Heart Failure, Transplantation and Mechanical Circulatory Support  Director - Adult Congenital and Cardiovascular Genetics Center  Associate Professor of Medicine, [facility]      Video-Visit Details  Type of service:  Video Visit  Video Start Time: 13:30  Video End Time (time video stopped): 13:49  Originating Location (pt. Location): Home  Distant Location (provider location):  [facility]   Mode of Communication:  Video Conference via [employer]

## 2020-06-05 ENCOUNTER — HOSPITAL ENCOUNTER (OUTPATIENT)
Dept: LAB | Facility: CLINIC | Age: 55
Discharge: HOME | End: 2020-06-05
Attending: INTERNAL MEDICINE | Admitting: INTERNAL MEDICINE
Payer: COMMERCIAL

## 2020-06-05 DIAGNOSIS — I42.8 ARRHYTHMOGENIC LEFT VENTRICULAR DYSPLASIA (H): ICD-10-CM

## 2020-06-05 DIAGNOSIS — I48.0 PAROXYSMAL ATRIAL FIBRILLATION (H): ICD-10-CM

## 2020-06-05 LAB
ALBUMIN SERPL-MCNC: 4 G/DL (ref 3.4–5)
ALP SERPL-CCNC: 74 U/L (ref 40–150)
ALT SERPL W P-5'-P-CCNC: 23 U/L (ref 0–50)
ANION GAP SERPL CALCULATED.3IONS-SCNC: 5 MMOL/L (ref 3–14)
AST SERPL W P-5'-P-CCNC: 19 U/L (ref 0–45)
BASOPHILS # BLD AUTO: 0 10E9/L (ref 0–0.2)
BASOPHILS NFR BLD AUTO: 0.7 %
BILIRUB SERPL-MCNC: 0.6 MG/DL (ref 0.2–1.3)
BUN SERPL-MCNC: 14 MG/DL (ref 7–30)
CALCIUM SERPL-MCNC: 9 MG/DL (ref 8.5–10.1)
CHLORIDE SERPL-SCNC: 109 MMOL/L (ref 94–109)
CHOLEST SERPL-MCNC: 141 MG/DL
CO2 SERPL-SCNC: 26 MMOL/L (ref 20–32)
CREAT SERPL-MCNC: 0.75 MG/DL (ref 0.52–1.04)
DIFFERENTIAL METHOD BLD: NORMAL
EOSINOPHIL # BLD AUTO: 0.1 10E9/L (ref 0–0.7)
EOSINOPHIL NFR BLD AUTO: 2.3 %
ERYTHROCYTE [DISTWIDTH] IN BLOOD BY AUTOMATED COUNT: 12.2 % (ref 10–15)
GFR SERPL CREATININE-BSD FRML MDRD: 89 ML/MIN/{1.73_M2}
GLUCOSE SERPL-MCNC: 103 MG/DL (ref 70–99)
HCT VFR BLD AUTO: 40.2 % (ref 35–47)
HDLC SERPL-MCNC: 69 MG/DL
HGB BLD-MCNC: 13.3 G/DL (ref 11.7–15.7)
IMM GRANULOCYTES # BLD: 0 10E9/L (ref 0–0.4)
IMM GRANULOCYTES NFR BLD: 0 %
LDLC SERPL CALC-MCNC: 58 MG/DL
LYMPHOCYTES # BLD AUTO: 1.5 10E9/L (ref 0.8–5.3)
LYMPHOCYTES NFR BLD AUTO: 34.7 %
MCH RBC QN AUTO: 30 PG (ref 26.5–33)
MCHC RBC AUTO-ENTMCNC: 33.1 G/DL (ref 31.5–36.5)
MCV RBC AUTO: 91 FL (ref 78–100)
MONOCYTES # BLD AUTO: 0.3 10E9/L (ref 0–1.3)
MONOCYTES NFR BLD AUTO: 7.5 %
NEUTROPHILS # BLD AUTO: 2.4 10E9/L (ref 1.6–8.3)
NEUTROPHILS NFR BLD AUTO: 54.8 %
NONHDLC SERPL-MCNC: 72 MG/DL
NRBC # BLD AUTO: 0 10*3/UL
NRBC BLD AUTO-RTO: 0 /100
PLATELET # BLD AUTO: 183 10E9/L (ref 150–450)
POTASSIUM SERPL-SCNC: 4 MMOL/L (ref 3.4–5.3)
PROT SERPL-MCNC: 7.1 G/DL (ref 6.8–8.8)
RBC # BLD AUTO: 4.43 10E12/L (ref 3.8–5.2)
SODIUM SERPL-SCNC: 140 MMOL/L (ref 133–144)
TRIGL SERPL-MCNC: 70 MG/DL
TSH SERPL DL<=0.005 MIU/L-ACNC: 2.16 MU/L (ref 0.4–4)
WBC # BLD AUTO: 4.4 10E9/L (ref 4–11)

## 2020-06-05 PROCEDURE — 80053 COMPREHEN METABOLIC PANEL: CPT | Performed by: INTERNAL MEDICINE

## 2020-06-05 PROCEDURE — 85025 COMPLETE CBC W/AUTO DIFF WBC: CPT | Performed by: INTERNAL MEDICINE

## 2020-06-05 PROCEDURE — 80061 LIPID PANEL: CPT | Performed by: INTERNAL MEDICINE

## 2020-06-05 PROCEDURE — 36415 COLL VENOUS BLD VENIPUNCTURE: CPT | Performed by: INTERNAL MEDICINE

## 2020-06-05 PROCEDURE — 84443 ASSAY THYROID STIM HORMONE: CPT | Performed by: INTERNAL MEDICINE

## 2020-06-12 ENCOUNTER — APPOINTMENT (OUTPATIENT)
Age: 55
Setting detail: DERMATOLOGY
End: 2020-06-12

## 2020-06-12 DIAGNOSIS — Z41.9 ENCOUNTER FOR PROCEDURE FOR PURPOSES OTHER THAN REMEDYING HEALTH STATE, UNSPECIFIED: ICD-10-CM

## 2020-06-12 PROCEDURE — OTHER THERMAGE FLX: OTHER

## 2020-06-12 PROCEDURE — OTHER FRAXEL: OTHER

## 2020-06-12 ASSESSMENT — LOCATION DETAILED DESCRIPTION DERM: LOCATION DETAILED: INFERIOR MID FOREHEAD

## 2020-06-12 ASSESSMENT — LOCATION ZONE DERM: LOCATION ZONE: FACE

## 2020-06-12 ASSESSMENT — LOCATION SIMPLE DESCRIPTION DERM: LOCATION SIMPLE: INFERIOR FOREHEAD

## 2020-06-12 NOTE — PROCEDURE: THERMAGE FLX
Price (Use Numbers Only, No Special Characters Or $): 2714 Price (Use Numbers Only, No Special Characters Or $): 3174

## 2020-06-12 NOTE — PROCEDURE: THERMAGE FLX
Post-Procedure Text: ***DOCUMENTED ON PAPER - SEE SCANNED DOCUMENTS***\\n\\nTHERMAGE FLX & FAXEL (Face) TREATMENT Performed Today \\n

## 2020-06-12 NOTE — PROCEDURE: FRAXEL
Add Post-Care Below To The Note: No
Wavelength: 1550nm and 1927nm
Treatment Level: 1
Detail Level: Zone
Indication: dyschromia
Price (Use Numbers Only, No Special Characters Or $): 1200
Topical Anesthesia Type: 7% Lidocaine 7% Tetracaine
Anesthesia Type: 1% lidocaine with epinephrine
Pre-Procedure Text (Will Appear After Anesthesia Text): ***DOCUMENTED ON PAPER - SEE SCANNED DOCUMENTS***\\n\\nFRAXEL - Treatment performed today.
External Cooling Fan Speed: 5

## 2020-06-24 ENCOUNTER — HOSPITAL ENCOUNTER (OUTPATIENT)
Dept: MRI IMAGING | Facility: CLINIC | Age: 55
End: 2020-06-24
Attending: INTERNAL MEDICINE
Payer: COMMERCIAL

## 2020-06-24 ENCOUNTER — HOSPITAL ENCOUNTER (OUTPATIENT)
Dept: CARDIOLOGY | Facility: CLINIC | Age: 55
End: 2020-06-24
Attending: INTERNAL MEDICINE
Payer: COMMERCIAL

## 2020-06-24 DIAGNOSIS — I42.8 ARRHYTHMOGENIC LEFT VENTRICULAR DYSPLASIA (H): ICD-10-CM

## 2020-06-24 PROCEDURE — 75561 CARDIAC MRI FOR MORPH W/DYE: CPT

## 2020-06-24 PROCEDURE — A9585 GADOBUTROL INJECTION: HCPCS | Performed by: INTERNAL MEDICINE

## 2020-06-24 PROCEDURE — 94621 CARDIOPULM EXERCISE TESTING: CPT | Mod: 26 | Performed by: INTERNAL MEDICINE

## 2020-06-24 PROCEDURE — 94621 CARDIOPULM EXERCISE TESTING: CPT

## 2020-06-24 PROCEDURE — 25500064 ZZH RX 255 OP 636: Performed by: INTERNAL MEDICINE

## 2020-06-24 PROCEDURE — 75561 CARDIAC MRI FOR MORPH W/DYE: CPT | Mod: 26 | Performed by: INTERNAL MEDICINE

## 2020-06-24 PROCEDURE — 93278 ECG/SIGNAL-AVERAGED: CPT

## 2020-06-24 PROCEDURE — 93010 ELECTROCARDIOGRAM REPORT: CPT | Performed by: INTERNAL MEDICINE

## 2020-06-24 RX ORDER — GADOBUTROL 604.72 MG/ML
10 INJECTION INTRAVENOUS ONCE
Status: COMPLETED | OUTPATIENT
Start: 2020-06-24 | End: 2020-06-24

## 2020-06-24 RX ADMIN — GADOBUTROL 10 ML: 604.72 INJECTION INTRAVENOUS at 11:15

## 2020-07-03 ENCOUNTER — VIRTUAL VISIT (OUTPATIENT)
Dept: CARDIOLOGY | Facility: CLINIC | Age: 55
End: 2020-07-03
Attending: INTERNAL MEDICINE
Payer: COMMERCIAL

## 2020-07-03 VITALS — BODY MASS INDEX: 24.71 KG/M2 | HEIGHT: 68 IN | WEIGHT: 163 LBS

## 2020-07-03 DIAGNOSIS — I42.8 ARRHYTHMOGENIC LEFT VENTRICULAR DYSPLASIA (H): ICD-10-CM

## 2020-07-03 DIAGNOSIS — I48.0 PAROXYSMAL ATRIAL FIBRILLATION (H): ICD-10-CM

## 2020-07-03 DIAGNOSIS — I47.10 SUPRAVENTRICULAR TACHYCARDIA (H): Primary | ICD-10-CM

## 2020-07-03 PROCEDURE — 99214 OFFICE O/P EST MOD 30 MIN: CPT | Mod: 95

## 2020-07-03 PROCEDURE — 99214 OFFICE O/P EST MOD 30 MIN: CPT | Mod: 95 | Performed by: INTERNAL MEDICINE

## 2020-07-03 RX ORDER — UBIDECARENONE 200 MG
CAPSULE ORAL
COMMUNITY

## 2020-07-03 RX ORDER — METOPROLOL TARTRATE 25 MG/1
TABLET, FILM COATED ORAL
Qty: 270 TABLET | Refills: 3 | Status: SHIPPED | OUTPATIENT
Start: 2020-07-03 | End: 2021-02-09

## 2020-07-03 ASSESSMENT — PAIN SCALES - GENERAL
PAINLEVEL: NO PAIN (0)
PAINLEVEL: NO PAIN (0)

## 2020-07-03 ASSESSMENT — MIFFLIN-ST. JEOR: SCORE: 1382.86

## 2020-07-03 NOTE — PATIENT INSTRUCTIONS
You were seen today in the Adult Congenital and Cardiovascular Genetics Clinic at the [facility].    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  Continue to eat a heart healthy, low salt diet.  2.  Continue to get 20-30 minutes of aerobic activity, 4-5 days per week.  Examples of aerobic activity include walking, running, swimming, cycling, etc.  3.  Continue to observe good oral hygiene, with regular dental visits.  4.  Continue to take Metoprolol 50 mg in the AM and 25 mg in the PM  5.  We will call to get an appointment set up for you to meet with Luba Michelle to discuss genetic testing.    6.  .  Refrain from the use of stimulants. Limit alcohol intake to  3-4 drinks/week  7.  Report to the ER for any syncopal event.  8.  We will call you to get a implantable loop recorder placed      SBE prophylaxis:   Yes____  No_x___    Lifelong Bacterial Endocarditis Prophylaxis:  YES____  NO____    If YES is checked, follow the recommendations outlined below:   1. Take antibiotic(s) prior to recommended dental procedures and procedures on the respiratory tract or with infected skin, muscle or bones. SBE prophylaxis is not needed for routine GI and  procedures (ie. Colonoscopy or vaginal delivery)   2. Observe good oral hygiene daily, as advised by your dentist. Get regular professional dental care.   3. Keep cuts clean.   4. Infections should be treated promptly.   5. Symptoms of Infective Endocarditis could include: fever lasting more than 4-5 days or a recurrent fever that initially resolves but returns within 1-2 days)     Exercise restrictions:   Yes__x__  No____         If yes, list restrictions:  Must be allowed to rest if fatigued or SOB    1. Restrict from  endurance or competitive athletics  2. Should be able to comfortably speak 5-7 words during all times with exercise  3. Should keep HR <85% predicted maximum HR- 140 BPM      Work restrictions:  Yes____  No__x__         If yes, list restrictions:    FASTING CHOLESTEROL was checked in the last 5 years YES_x__  NO___  2020  Continue to eat a heart healthy, low salt diet.         ____ Fasting lipid panel order today         ____ No changes in medications          ____ I recommend the following changes in your cholesterol medications.:          ____ Please follow up for cholesterol screening at your primary care physician      Follow-up:  Follow up with Dr Sorenson and Dr Barlow in one year with a 14 day Zio monitor or device check if you have your loop monitor placed and stair test.     For after hours urgent needs, call [phone] and ask to speak to the Adult Congenital Physician on call.  Mention Job Code 0401.    For emergencies call [phone].    For any scheduling needs, please call Lubna Gerard, Procedure , at [phone]  Thank you for your visit today!  If you have questions or concerns about today's visit, please call me.    Tirso Cheney, RN, BSN  Cardiology Care Coordinator  [facility]  [phone]    [address]  Mail Code [id number]  [place] [address]

## 2020-07-03 NOTE — PROGRESS NOTES
"Uzma Clemons is a 54 year old female who is being evaluated via a billable video visit.      The patient has been notified of following:     \"This video visit will be conducted via a call between you and your physician/provider. We have found that certain health care needs can be provided without the need for an in-person physical exam.  This service lets us provide the care you need with a video conversation.  If a prescription is necessary we can send it directly to your pharmacy.  If lab work is needed we can place an order for that and you can then stop by our lab to have the test done at a later time.    Video visits are billed at different rates depending on your insurance coverage.  Please reach out to your insurance provider with any questions.    If during the course of the call the physician/provider feels a video visit is not appropriate, you will not be charged for this service.\"    Patient has given verbal consent for Video visit? Yes  How would you like to obtain your AVS? [employer]  Patient would like the video invitation sent by: Send to e-mail at: [email or web address] Patient will be joining the visit via MyChart connect.  Will anyone else be joining your video visit? Yes:  and mother of patient. How would they like to receive their invitation? Send to e-mail at: [email or web address]      Medications were reconciled.     Luba Tejeda CMA    12:42 PM    Additional Provider Detail:    Cardiac Electrophysiology Clinic:    HPI:  56 yo female with likely arrhythmogenic cardiomyopathy with h/o atrial arrhythmias s/p ablation presents for ongoing evaluation and management..    She reports doing well. She has had some fatigue she attributes to the metoprolol but remains active. She has not had syncope, near syncope or significant palpitations.     PAST MEDICAL HISTORY:  Past Medical History:   Diagnosis Date     ADD (attention deficit disorder)      Atrial flutter (H)     ablation at [facility]" [facility] 2013     Depression      Esophageal reflux      Other forms of migraine, without mention of intractable migraine without mention of status migrainosus      Paroxysmal atrial fibrillation (H)      Paroxysmal supraventricular tachycardia (H)      Unspecified asthma(493.90)        FAMILY HISTORY:  Family History   Problem Relation Age of Onset     Arrhythmia Mother         takes Metoprol      Cancer Father         lung-smoker     Myocardial Infarction Paternal Grandfather      Cancer Brother         tish passed, unsure if it was heart related     Bronchitis Brother    A four generation family history was obtained at office visit on 2017.  (Please see scanned pedigree for details).  Family history was significant for the following: Татьяна's son experienced chest pains, back tightness, and fever in 2017.  MRI lists the primary differential diagnosis is viral myocarditis versus a\" hot phase\" of left dominant arrhythmogenic cardiomyopathy (LDAC.) Татьяна's brother  suddenly at 49 years of age.  Autopsy revealed cardiomegaly and moderate fat investment on heart surface.  Another brother  at 54 years.  Autopsy, not yet reviewed by us, stated brain aneurysm as cause of death.  They are unaware of any heart findings.  A third brother has LV hypertrophy, reduced LVEF, and some LA enlargement. Татьяна's mother also has a history of arrhythmia since 63 years.  She has reported palpitations and dizziness. Little information is known about Татьяна's father.  Although he  around 55 years from suspected cancer.  There is no additional history of cardiomyopathy, arrythmias, heart attacks, fainting, sudden cardiac death, genetic conditions, or birth defects.      SOCIAL HISTORY:  Social History     Social History     Marital status:      Spouse name: N/A     Number of children: N/A     Years of education: N/A     Social History Main Topics     Smoking status: Never Smoker     Smokeless tobacco: Not " on file     Alcohol use Yes      Comment: 3-5 per week     Drug use: No     Sexual activity: Yes     Partners: Male     Birth control/ protection: Surgical      Comment: vasectomy     Other Topics Concern     Parent/Sibling W/ Cabg, Mi Or Angioplasty Before 65f 55m? No     Caffeine Concern No     2 cups of tea a day , occ Monster drink     Sleep Concern Yes     CPAP every night     Stress Concern No     Weight Concern No     Special Diet No     Exercise No     Seat Belt Yes     Social History Narrative       CURRENT MEDICATIONS:  Current Outpatient Medications   Medication Sig Dispense Refill     Cholecalciferol (VITAMIN D) 1000 UNITS capsule Take 1 capsule by mouth daily       coenzyme Q-10 200 MG CAPS        order for DME CPAP every night       venlafaxine (EFFEXOR XR) 37.5 MG 24 hr capsule Take 37.5 mg by mouth daily       ZOMIG ZMT 2.5 MG OR TBDP 1 TAB AT THE START OF THE ATTACK, THEN AS DIRECT 12 2     metoprolol tartrate (LOPRESSOR) 25 MG tablet Take 50 mg (2 tablets) in the AM and 25 mg in the  tablet 3         ROS:   Constitutional: No fever, chills, or sweats. No weight gain/loss.   ENT: No visual disturbance, ear ache, epistaxis, sore throat.   Allergies/Immunologic: Negative.   Respiratory: No cough, hemoptysis.   Cardiovascular: As per HPI.   GI: No nausea, vomiting, hematemesis, melena, or hematochezia.   : No urinary frequency, dysuria, or hematuria.   Integument: Negative.   Psychiatric: Negative.   Neuro: Negative.   Endocrinology: Negative.   Musculoskeletal: Negative.    EXAM:  There were no vitals taken for this visit. given virtual visit  Constitutional - WDWN, no acute distress   Eyes - no redness or discharge, nonicteric sclera  Respiratory - nonlabored breathing, able to speak in full sentences without difficulty  CV: no visible edema of visualized upper extremities.  Neurological - alert and oriented, speech fluent/appropriate  PSYCH: cooperative, affect appropriate  DERM: no rashes  on visualized face/neck/upper extremities     The rest of a comprehensive physical examination is deferred due to PHE (public health emergency) video visit restrictions      Lab and diagnostic data reviewed [id number]:       CARDIAC W CONTRAST 5/26/2017   Indication:  Family history of ischemic heart disease and other  diseases of the circulatory system  Cardiac MRI with and without contrast was performed on a 1.5 T scanner  to evaluate myocardial morphology, function and viability, and to  assess for arrhythmogenic cardiomyopathy in a 51-year-old female with  paroxysmal atrial fibrillation and atrial flutter status post ablation  with family history of sudden death and possible left dominant  arrhythmogenic cardiomyopathy in a son.  1. The left ventricle is normal in cavity size and wall thickness. The  global systolic function is normal. The quantitative LV ejection  fraction is 57%. There are no regional wall motion abnormalities.   LV volumes absolute and indexed to BSA with age and gender-matched  normal values in parentheses (mean, 95% CI) are listed below:  EDV: 159 ml (126,  mL)  EDVI: 68 ml/m2 (79, 62-97 mL/m2)  ESV: 68 ml (51,29-74 mL)  ESVI: 36 ml/m2  (26, 15-37 mL/m2)  2. The right ventricle is normal in end-diastolic and end-systolic  volumes. The global systolic function is normal. The quantitative RV  ejection fraction is 60%. There is hypokinesis of the apical third of  the right ventricular free wall. There appears to be one small area of  dyskinesia in the RV free wall. In the presence of normal RV size and  function, these regional abnormalities do not satisfy the Task Force  CMR criteria for the diagnosis of ARVC.  RV volumes absolute and indexed to BSA with age and gender-matched  normal values in parentheses (mean, 95% CI) are listed below:  EDV: 162 ml (124,  mL)  EDVI: 86 ml/m2 (72, 53-90 mL/m2)  ESV: 65 ml (42, 15-68 mL)  ESVI: 35 ml/m2  (24, 11-37 mL/m2)  3. Both atria are normal  in size.  4. The aortic valve is trileaflet in morphology. There is no  significant aortic valve stenosis or regurgitation.  5. There is no other significant valvular disease.  6. Delayed enhancement imaging demonstrates no evidence of myocardial  infarction, fibrosis or infiltrative disease.  IMPRESSION:  Normal LV and RV size and function. Regional abnormalities of the RV  that do not meet the Task Force CMR criteria for the diagnosis of  ARVC. A comprehensive workup for arrhythmogenic cardiomyopathy should  nevertheless be pursued.    Zio Monitor April 2020      Assessment and Plan: Diagnosis:  56 yo female with likely arrhythmogenic cardiomyopathy with h/o atrial arrhythmias s/p ablation presents for ongoing evaluation and management.    1. Probable arrhythmogenic cardiomyopathy  -  2. Atrial arrhythmias, including atrial fibrillation (s/p ablation)  3. Non-sustained VT, asymptomatic    She agreed earlier with Dr. Sorenson to increase her metoprolol dose.     I reviewed various options with her. Placement of an ICD is certainly one option, the fact she has not been definitively diagnosed with AC makes that option somewhat less clear. At the other extreme would be no change in therapy. Finally, we discussed and implantable loop recorder, which would allow for longer term monitoring.     After considering the above she would like to proceed with an implantable loop recorder.       Video-Visit Details    Type of service:  Video Visit    Video Start Time: 1:05 PM  Video End Time: 1:22 PM    Originating Location (pt. Location): Home    Distant Location (provider location):  [facility]     Platform used for Video Visit: Dione Barlow MD

## 2020-07-03 NOTE — PATIENT INSTRUCTIONS
You were seen today in the Adult Congenital and Cardiovascular Genetics Clinic at the [facility].    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  Continue to eat a heart healthy, low salt diet.  2.  Continue to get 20-30 minutes of aerobic activity, 4-5 days per week.  Examples of aerobic activity include walking, running, swimming, cycling, etc.  3.  Continue to observe good oral hygiene, with regular dental visits.  4.  Continue to take Metoprolol 50 mg in the AM and 25 mg in the PM  5.  We will call to get an appointment set up for you to meet with Luba Michelle to discuss genetic testing.    6.  .  Refrain from the use of stimulants. stimulants, limit alcohol intake to  3-4 drinks/week  7.  Report to the ER for any syncopal event.  8.  We will call you to get a implantable loop recorder placed      SBE prophylaxis:   Yes____  No_x___    Lifelong Bacterial Endocarditis Prophylaxis:  YES____  NO____    If YES is checked, follow the recommendations outlined below:   1. Take antibiotic(s) prior to recommended dental procedures and procedures on the respiratory tract or with infected skin, muscle or bones. SBE prophylaxis is not needed for routine GI and  procedures (ie. Colonoscopy or vaginal delivery)   2. Observe good oral hygiene daily, as advised by your dentist. Get regular professional dental care.   3. Keep cuts clean.   4. Infections should be treated promptly.   5. Symptoms of Infective Endocarditis could include: fever lasting more than 4-5 days or a recurrent fever that initially resolves but returns within 1-2 days)     Exercise restrictions:   Yes__x__  No____         If yes, list restrictions:  Must be allowed to rest if fatigued or SOB    1.  Restrict from endurance or competitive athletics  2. Should be able to comfortably speak 5-7 words during all times with exercise  3. Should keep HR <85% predicted maximum HR- 140 BPM      Work restrictions:  Yes____  No__x__         If yes, list restrictions:    FASTING CHOLESTEROL was checked in the last 5 years YES_x__  NO___  2020  Continue to eat a heart healthy, low salt diet.         ____ Fasting lipid panel order today         ____ No changes in medications          ____ I recommend the following changes in your cholesterol medications.:          ____ Please follow up for cholesterol screening at your primary care physician      Follow-up:  Follow up with Dr Sorenson and Dr Barlow in one year with a 14 day Zio monitor or device check if you have your loop monitor placed and stair test.     For after hours urgent needs, call [phone] and ask to speak to the Adult Congenital Physician on call.  Mention Job Code 0401.    For emergencies call 541.    For any scheduling needs, please call [facility] , at [phone]  Thank you for your visit today!  If you have questions or concerns about today's visit, please call me.    Tirso Cheney, RN, BSN  Cardiology Care Coordinator  [facility]  [phone]    [address]  Mail Code [id number]  [place] [address]

## 2020-07-03 NOTE — PROGRESS NOTES
"Uzma Clemons is a 54 year old female who is being evaluated via a billable video visit.      The patient has been notified of following:     \"This video visit will be conducted via a call between you and your physician/provider. We have found that certain health care needs can be provided without the need for an in-person physical exam.  This service lets us provide the care you need with a video conversation.  If a prescription is necessary we can send it directly to your pharmacy.  If lab work is needed we can place an order for that and you can then stop by our lab to have the test done at a later time.    Video visits are billed at different rates depending on your insurance coverage.  Please reach out to your insurance provider with any questions.    If during the course of the call the physician/provider feels a video visit is not appropriate, you will not be charged for this service.\"    Patient has given verbal consent for Video visit? Yes  How would you like to obtain your AVS? [employer]  Patient would like the video invitation sent by: Send to e-mail at: [email or web address] and will connect via [employer] connect  Will anyone else be joining your video visit? Yes:  and mother of patient. How would they like to receive their invitation? Send to e-mail at: [employer]        Vitals - Patient Reported 7/3/2020   Height (Patient Reported) 5' 8\"   Weight (Patient Reported) 163 lb   BMI (Based on Pt Reported Ht/Wt) 24.78 kg/m2         Medications were reconciled.     Luba Tejeda CMA    12:15 PM        HPI: 56 yo with ARVC.  Doing well.  No new complaints.    PAST MEDICAL HISTORY:  Past Medical History:   Diagnosis Date     ADD (attention deficit disorder)      Atrial flutter (H)     ablation at [facility] 2013     Depression      Esophageal reflux      Other forms of migraine, without mention of intractable migraine without mention of status migrainosus      Paroxysmal atrial fibrillation (H)      " Paroxysmal supraventricular tachycardia (H)      Unspecified asthma(493.90)        FAMILY HISTORY:  Family History   Problem Relation Age of Onset     Arrhythmia Mother         takes Metoprol      Cancer Father         lung-smoker     Myocardial Infarction Paternal Grandfather      Cancer Brother         jsut passed, unsure if it was heart related     Bronchitis Brother        SOCIAL HISTORY:  Social History     Socioeconomic History     Marital status:      Spouse name: None     Number of children: None     Years of education: None     Highest education level: None   Occupational History     None   Social Needs     Financial resource strain: None     Food insecurity     Worry: None     Inability: None     Transportation needs     Medical: None     Non-medical: None   Tobacco Use     Smoking status: Never Smoker     Smokeless tobacco: Never Used   Substance and Sexual Activity     Alcohol use: Yes     Comment: 3-5 per week     Drug use: No     Sexual activity: Yes     Partners: Male     Birth control/protection: Surgical     Comment: vasectomy   Lifestyle     Physical activity     Days per week: None     Minutes per session: None     Stress: None   Relationships     Social connections     Talks on phone: None     Gets together: None     Attends [employer] service: None     Active member of club or organization: None     Attends meetings of clubs or organizations: None     Relationship status: None     Intimate partner violence     Fear of current or ex partner: None     Emotionally abused: None     Physically abused: None     Forced sexual activity: None   Other Topics Concern     Parent/sibling w/ CABG, MI or angioplasty before 65F 55M? No      Service Not Asked     Blood Transfusions Not Asked     Caffeine Concern No     Comment: 2 cups of tea a day , occ Monster drink     Occupational Exposure Not Asked     Hobby Hazards Not Asked     Sleep Concern Yes     Comment: CPAP every night     Stress  "Concern No     Weight Concern No     Special Diet No     Back Care Not Asked     Exercise No     Bike Helmet Not Asked     Seat Belt Yes     Self-Exams Not Asked   Social History Narrative     None       CURRENT MEDICATIONS:  Current Outpatient Medications   Medication Sig Dispense Refill     Cholecalciferol (VITAMIN D) 1000 UNITS capsule Take 1 capsule by mouth daily       coenzyme Q-10 200 MG CAPS        metoprolol tartrate (LOPRESSOR) 25 MG tablet Take 50 mg (2 tablets) in the AM and 25 mg in the  tablet 3     order for DME CPAP every night       venlafaxine (EFFEXOR XR) 37.5 MG 24 hr capsule Take 37.5 mg by mouth daily       ZOMIG ZMT 2.5 MG OR TBDP 1 TAB AT THE START OF THE ATTACK, THEN AS DIRECT 12 2       ROS:   Constitutional: No fever, chills, or sweats. No weight gain/loss.   ENT: No visual disturbance, ear ache, epistaxis, sore throat.   Allergies/Immunologic: Negative.   Respiratory: No cough, hemoptysis.   Cardiovascular: As per HPI.   GI: No nausea, vomiting, hematemesis, melena, or hematochezia.   : No urinary frequency, dysuria, or hematuria.   Integument: Negative.   Psychiatric: Negative.   Neuro: Negative.   Endocrinology: Negative.   Musculoskeletal: Negative.    EXAM:  Ht 1.727 m (5' 8\")   Wt 73.9 kg (163 lb)   BMI 24.78 kg/m    There were no vitals taken for this visit. given virtual visit  Constitutional - WDWN, no acute distress   Eyes - no redness or discharge, nonicteric sclera  Respiratory - nonlabored breathing, able to speak in full sentences without difficulty  CV: no visible edema of visualized upper extremities.  Neurological - alert and oriented, speech fluent/appropriate  PSYCH: cooperative, affect appropriate  DERM: no rashes on visualized face/neck/upper extremities     The rest of a comprehensive physical examination is deferred due to PHE (public health emergency) video visit restrictions    Labs:  CBC RESULTS:  Lab Results   Component Value Date    WBC 4.4 06/05/2020 "    RBC 4.43 06/05/2020    HGB 13.3 06/05/2020    HCT 40.2 06/05/2020    MCV 91 06/05/2020    MCH 30.0 06/05/2020    MCHC 33.1 06/05/2020    RDW 12.2 06/05/2020     06/05/2020       CMP RESULTS:  Lab Results   Component Value Date     06/05/2020    POTASSIUM 4.0 06/05/2020    CHLORIDE 109 06/05/2020    CO2 26 06/05/2020    ANIONGAP 5 06/05/2020     (H) 06/05/2020    BUN 14 06/05/2020    CR 0.75 06/05/2020    GFRESTIMATED 89 06/05/2020    GFRESTBLACK >90 06/05/2020    TONY 9.0 06/05/2020    BILITOTAL 0.6 06/05/2020    ALBUMIN 4.0 06/05/2020    ALKPHOS 74 06/05/2020    ALT 23 06/05/2020    AST 19 06/05/2020        INR RESULTS:  No results found for: INR    No results found for: MAG  No results found for: NTBNPI  No results found for: NTBNP    Assessment and Plan:   1.Had extensive discussion with patient about pathophysiology of arrhythmogenic cardiomyopathy.   To try and reduce arrhythmia risk disease progression will initiate beta-blockage therapy.  Will begin patient on Metoprolol XL 25 mg daily.  After one month will have patient increase Metoprolol XL to 50 mg daily.  Reviewed with patient recommendations regarding exercise safety with arrhythmogenic cardiomyopathy.  Recommended that patient be able to comfortably speak 5-7 words at peak exercise and keep HR < 85% max predicted HR, thus for patient < xxx bpm, during exercise.  Also discussed with patient the need to avoid all stimulants including Sudafed and all ephedrine containing products as well as methamphetamines and cocaine and limit alcohol intake to less than XX drinks per week..  Pt again reminded that all first degree relatives should be screened for arrhythmogenic cardiomyopathy with cardiac MRI, EKG and Holter/Zio monitor.  If family members decide to pursue genetic testing, then clinical screening would be indicated in gene positive, not gene negative, individuals.  Also discussed with patient the recommendation of loop/ICD  implantation.  Pt further discussed this with Dr. Antonio Rodrigues/Jw Barlow at his EP appt today.  Please see his note for detailed discussion and plan.  Pt educated that any syncopal episode is considered a medical emergency requiring ER visit/evaluaion.    Madison Sorenson MD  Section Head - Advanced Heart Failure, Transplantation and Mechanical Circulatory Support  Director - Adult Congenital and Cardiovascular Genetics Center  Associate Professor of Medicine, [facility]

## 2020-07-03 NOTE — LETTER
"7/3/2020      RE: Uzma Clemons  [address] [facility] [address]       Dear Colleague,    Thank you for the opportunity to participate in the care of your patient, Uzma Clemons, at the [facility] at [facility]. Please see a copy of my visit note below.    Uzma Clemons is a 54 year old female who is being evaluated via a billable video visit.      The patient has been notified of following:     \"This video visit will be conducted via a call between you and your physician/provider. We have found that certain health care needs can be provided without the need for an in-person physical exam.  This service lets us provide the care you need with a video conversation.  If a prescription is necessary we can send it directly to your pharmacy.  If lab work is needed we can place an order for that and you can then stop by our lab to have the test done at a later time.    Video visits are billed at different rates depending on your insurance coverage.  Please reach out to your insurance provider with any questions.    If during the course of the call the physician/provider feels a video visit is not appropriate, you will not be charged for this service.\"    Patient has given verbal consent for Video visit? Yes  How would you like to obtain your AVS? Avectra  Patient would like the video invitation sent by: Send to e-mail at: [email or web address] Patient will be joining the visit via MyChart connect.  Will anyone else be joining your video visit? Yes:  and mother of patient. How would they like to receive their invitation? Send to e-mail at: [email or web address]      Medications were reconciled.     Luba Tejeda CMA    12:42 PM    Additional Provider Detail:    Cardiac Electrophysiology Clinic:    HPI:  56 yo female with likely arrhythmogenic cardiomyopathy with h/o atrial arrhythmias s/p ablation presents for ongoing evaluation and management..    She " "reports doing well. She has had some fatigue she attributes to the metoprolol but remains active. She has not had syncope, near syncope or significant palpitations.     PAST MEDICAL HISTORY:  Past Medical History:   Diagnosis Date     ADD (attention deficit disorder)      Atrial flutter (H)     ablation at [facility] 2013     Depression      Esophageal reflux      Other forms of migraine, without mention of intractable migraine without mention of status migrainosus      Paroxysmal atrial fibrillation (H)      Paroxysmal supraventricular tachycardia (H)      Unspecified asthma(493.90)        FAMILY HISTORY:  Family History   Problem Relation Age of Onset     Arrhythmia Mother         takes Metoprol      Cancer Father         lung-smoker     Myocardial Infarction Paternal Grandfather      Cancer Brother         tish passed, unsure if it was heart related     Bronchitis Brother    A four generation family history was obtained at office visit on 2017.  (Please see scanned pedigree for details).  Family history was significant for the following: Татьяна's son experienced chest pains, back tightness, and fever in 2017.  MRI lists the primary differential diagnosis is viral myocarditis versus a\" hot phase\" of left dominant arrhythmogenic cardiomyopathy (LDAC.) Татьяна's brother  suddenly at 49 years of age.  Autopsy revealed cardiomegaly and moderate fat investment on heart surface.  Another brother  at 54 years.  Autopsy, not yet reviewed by us, stated brain aneurysm as cause of death.  They are unaware of any heart findings.  A third brother has LV hypertrophy, reduced LVEF, and some LA enlargement. Татьяна's mother also has a history of arrhythmia since 63 years.  She has reported palpitations and dizziness. Little information is known about Татьяна's father.  Although he  around 55 years from suspected cancer.  There is no additional history of cardiomyopathy, arrythmias, heart attacks, fainting, " sudden cardiac death, genetic conditions, or birth defects.      SOCIAL HISTORY:  Social History     Social History     Marital status:      Spouse name: N/A     Number of children: N/A     Years of education: N/A     Social History Main Topics     Smoking status: Never Smoker     Smokeless tobacco: Not on file     Alcohol use Yes      Comment: 3-5 per week     Drug use: No     Sexual activity: Yes     Partners: Male     Birth control/ protection: Surgical      Comment: vasectomy     Other Topics Concern     Parent/Sibling W/ Cabg, Mi Or Angioplasty Before 65f 55m? No     Caffeine Concern No     2 cups of tea a day , occ Monster drink     Sleep Concern Yes     CPAP every night     Stress Concern No     Weight Concern No     Special Diet No     Exercise No     Seat Belt Yes     Social History Narrative       CURRENT MEDICATIONS:  Current Outpatient Medications   Medication Sig Dispense Refill     Cholecalciferol (VITAMIN D) 1000 UNITS capsule Take 1 capsule by mouth daily       coenzyme Q-10 200 MG CAPS        order for DME CPAP every night       venlafaxine (EFFEXOR XR) 37.5 MG 24 hr capsule Take 37.5 mg by mouth daily       ZOMIG ZMT 2.5 MG OR TBDP 1 TAB AT THE START OF THE ATTACK, THEN AS DIRECT 12 2     metoprolol tartrate (LOPRESSOR) 25 MG tablet Take 50 mg (2 tablets) in the AM and 25 mg in the  tablet 3         ROS:   Constitutional: No fever, chills, or sweats. No weight gain/loss.   ENT: No visual disturbance, ear ache, epistaxis, sore throat.   Allergies/Immunologic: Negative.   Respiratory: No cough, hemoptysis.   Cardiovascular: As per HPI.   GI: No nausea, vomiting, hematemesis, melena, or hematochezia.   : No urinary frequency, dysuria, or hematuria.   Integument: Negative.   Psychiatric: Negative.   Neuro: Negative.   Endocrinology: Negative.   Musculoskeletal: Negative.    EXAM:  There were no vitals taken for this visit. given virtual visit  Constitutional - WDWN, no acute distress    Eyes - no redness or discharge, nonicteric sclera  Respiratory - nonlabored breathing, able to speak in full sentences without difficulty  CV: no visible edema of visualized upper extremities.  Neurological - alert and oriented, speech fluent/appropriate  PSYCH: cooperative, affect appropriate  DERM: no rashes on visualized face/neck/upper extremities     The rest of a comprehensive physical examination is deferred due to PHE (public health emergency) video visit restrictions      Lab and diagnostic data reviewed [id number]:       CARDIAC W CONTRAST 5/26/2017   Indication:  Family history of ischemic heart disease and other  diseases of the circulatory system  Cardiac MRI with and without contrast was performed on a 1.5 T scanner  to evaluate myocardial morphology, function and viability, and to  assess for arrhythmogenic cardiomyopathy in a 51-year-old female with  paroxysmal atrial fibrillation and atrial flutter status post ablation  with family history of sudden death and possible left dominant  arrhythmogenic cardiomyopathy in a son.  1. The left ventricle is normal in cavity size and wall thickness. The  global systolic function is normal. The quantitative LV ejection  fraction is 57%. There are no regional wall motion abnormalities.   LV volumes absolute and indexed to BSA with age and gender-matched  normal values in parentheses (mean, 95% CI) are listed below:  EDV: 159 ml (126,  mL)  EDVI: 68 ml/m2 (79, 62-97 mL/m2)  ESV: 68 ml (51,29-74 mL)  ESVI: 36 ml/m2  (26, 15-37 mL/m2)  2. The right ventricle is normal in end-diastolic and end-systolic  volumes. The global systolic function is normal. The quantitative RV  ejection fraction is 60%. There is hypokinesis of the apical third of  the right ventricular free wall. There appears to be one small area of  dyskinesia in the RV free wall. In the presence of normal RV size and  function, these regional abnormalities do not satisfy the Task Force  CMR  criteria for the diagnosis of ARVC.  RV volumes absolute and indexed to BSA with age and gender-matched  normal values in parentheses (mean, 95% CI) are listed below:  EDV: 162 ml (124,  mL)  EDVI: 86 ml/m2 (72, 53-90 mL/m2)  ESV: 65 ml (42, 15-68 mL)  ESVI: 35 ml/m2  (24, 11-37 mL/m2)  3. Both atria are normal in size.  4. The aortic valve is trileaflet in morphology. There is no  significant aortic valve stenosis or regurgitation.  5. There is no other significant valvular disease.  6. Delayed enhancement imaging demonstrates no evidence of myocardial  infarction, fibrosis or infiltrative disease.  IMPRESSION:  Normal LV and RV size and function. Regional abnormalities of the RV  that do not meet the Task Force CMR criteria for the diagnosis of  ARVC. A comprehensive workup for arrhythmogenic cardiomyopathy should  nevertheless be pursued.    Zio Monitor April 2020      Assessment and Plan: Diagnosis:  56 yo female with likely arrhythmogenic cardiomyopathy with h/o atrial arrhythmias s/p ablation presents for ongoing evaluation and management.    1. Probable arrhythmogenic cardiomyopathy  -  2. Atrial arrhythmias, including atrial fibrillation (s/p ablation)  3. Non-sustained VT, asymptomatic    She agreed earlier with Dr. Sorenson to increase her metoprolol dose.     I reviewed various options with her. Placement of an ICD is certainly one option, the fact she has not been definitively diagnosed with AC makes that option somewhat less clear. At the other extreme would be no change in therapy. Finally, we discussed and implantable loop recorder, which would allow for longer term monitoring.     After considering the above she would like to proceed with an implantable loop recorder.       Video-Visit Details    Type of service:  Video Visit    Video Start Time: 1:05 PM  Video End Time: 1:22 PM    Originating Location (pt. Location): Home    Distant Location (provider location):  [facility]     [employer]  used for Video Visit: Dione Barlow MD

## 2020-07-03 NOTE — LETTER
7/3/2020      RE: Uzma Clemons  [address] [facility] [address]       Dear Colleague,    Thank you for the opportunity to participate in the care of your patient, Uzma Clemons, at the [facility] acute [facility]. Please see a copy of my visit note below.    Uzma Clemons is a 54 year old female who is being evaluated via a billable video visit.        HPI: 56 yo with ARVC.  Doing well.  No new complaints.    PAST MEDICAL HISTORY:  Past Medical History:   Diagnosis Date     ADD (attention deficit disorder)      Atrial flutter (H)     ablation at [facility] 2013     Depression      Esophageal reflux      Other forms of migraine, without mention of intractable migraine without mention of status migrainosus      Paroxysmal atrial fibrillation (H)      Paroxysmal supraventricular tachycardia (H)      Unspecified asthma(493.90)        FAMILY HISTORY:  Family History   Problem Relation Age of Onset     Arrhythmia Mother         takes Metoprol      Cancer Father         lung-smoker     Myocardial Infarction Paternal Grandfather      Cancer Brother         jsut passed, unsure if it was heart related     Bronchitis Brother        SOCIAL HISTORY:  Social History     Socioeconomic History     Marital status:      Spouse name: None     Number of children: None     Years of education: None     Highest education level: None   Occupational History     None   Social Needs     Financial resource strain: None     Food insecurity     Worry: None     Inability: None     Transportation needs     Medical: None     Non-medical: None   Tobacco Use     Smoking status: Never Smoker     Smokeless tobacco: Never Used   Substance and Sexual Activity     Alcohol use: Yes     Comment: 3-5 per week     Drug use: No     Sexual activity: Yes     Partners: Male     Birth control/protection: Surgical     Comment: vasectomy   Lifestyle     Physical activity     Days per week:  None     Minutes per session: None     Stress: None   Relationships     Social connections     Talks on phone: None     Gets together: None     Attends [employer] service: None     Active member of club or organization: None     Attends meetings of clubs or organizations: None     Relationship status: None     Intimate partner violence     Fear of current or ex partner: None     Emotionally abused: None     Physically abused: None     Forced sexual activity: None   Other Topics Concern     Parent/sibling w/ CABG, MI or angioplasty before 65F 55M? No      Service Not Asked     Blood Transfusions Not Asked     Caffeine Concern No     Comment: 2 cups of tea a day , occ Monster drink     Occupational Exposure Not Asked     Hobby Hazards Not Asked     Sleep Concern Yes     Comment: CPAP every night     Stress Concern No     Weight Concern No     Special Diet No     Back Care Not Asked     Exercise No     Bike Helmet Not Asked     Seat Belt Yes     Self-Exams Not Asked   Social History Narrative     None       CURRENT MEDICATIONS:  Current Outpatient Medications   Medication Sig Dispense Refill     Cholecalciferol (VITAMIN D) 1000 UNITS capsule Take 1 capsule by mouth daily       coenzyme Q-10 200 MG CAPS        metoprolol tartrate (LOPRESSOR) 25 MG tablet Take 50 mg (2 tablets) in the AM and 25 mg in the  tablet 3     order for DME CPAP every night       venlafaxine (EFFEXOR XR) 37.5 MG 24 hr capsule Take 37.5 mg by mouth daily       ZOMIG ZMT 2.5 MG OR TBDP 1 TAB AT THE START OF THE ATTACK, THEN AS DIRECT 12 2       ROS:   Constitutional: No fever, chills, or sweats. No weight gain/loss.   ENT: No visual disturbance, ear ache, epistaxis, sore throat.   Allergies/Immunologic: Negative.   Respiratory: No cough, hemoptysis.   Cardiovascular: As per HPI.   GI: No nausea, vomiting, hematemesis, melena, or hematochezia.   : No urinary frequency, dysuria, or hematuria.   Integument: Negative.   Psychiatric:  "Negative.   Neuro: Negative.   Endocrinology: Negative.   Musculoskeletal: Negative.    EXAM:  Ht 1.727 m (5' 8\")   Wt 73.9 kg (163 lb)   BMI 24.78 kg/m    There were no vitals taken for this visit. given virtual visit  Constitutional - WDWN, no acute distress   Eyes - no redness or discharge, nonicteric sclera  Respiratory - nonlabored breathing, able to speak in full sentences without difficulty  CV: no visible edema of visualized upper extremities.  Neurological - alert and oriented, speech fluent/appropriate  PSYCH: cooperative, affect appropriate  DERM: no rashes on visualized face/neck/upper extremities     The rest of a comprehensive physical examination is deferred due to PHE (public health emergency) video visit restrictions    Labs:  CBC RESULTS:  Lab Results   Component Value Date    WBC 4.4 06/05/2020    RBC 4.43 06/05/2020    HGB 13.3 06/05/2020    HCT 40.2 06/05/2020    MCV 91 06/05/2020    MCH 30.0 06/05/2020    MCHC 33.1 06/05/2020    RDW 12.2 06/05/2020     06/05/2020       CMP RESULTS:  Lab Results   Component Value Date     06/05/2020    POTASSIUM 4.0 06/05/2020    CHLORIDE 109 06/05/2020    CO2 26 06/05/2020    ANIONGAP 5 06/05/2020     (H) 06/05/2020    BUN 14 06/05/2020    CR 0.75 06/05/2020    GFRESTIMATED 89 06/05/2020    GFRESTBLACK >90 06/05/2020    TONY 9.0 06/05/2020    BILITOTAL 0.6 06/05/2020    ALBUMIN 4.0 06/05/2020    ALKPHOS 74 06/05/2020    ALT 23 06/05/2020    AST 19 06/05/2020        INR RESULTS:  No results found for: INR    No results found for: MAG  No results found for: NTBNPI  No results found for: NTBNP    Assessment and Plan:   1.Had extensive discussion with patient about pathophysiology of arrhythmogenic cardiomyopathy.   To try and reduce arrhythmia risk disease progression will initiate beta-blockage therapy.  Will begin patient on Metoprolol XL 25 mg daily.  After one month will have patient increase Metoprolol XL to 50 mg daily.  Reviewed with " patient recommendations regarding exercise safety with arrhythmogenic cardiomyopathy.  Recommended that patient be able to comfortably speak 5-7 words at peak exercise and keep HR < 85% max predicted HR, thus for patient < xxx bpm, during exercise.  Also discussed with patient the need to avoid all stimulants including Sudafed and all ephedrine containing products as well as methamphetamines and cocaine and limit alcohol intake to less than XX drinks per week..  Pt again reminded that all first degree relatives should be screened for arrhythmogenic cardiomyopathy with cardiac MRI, EKG and Holter/Zio monitor.  If family members decide to pursue genetic testing, then clinical screening would be indicated in gene positive, not gene negative, individuals.  Also discussed with patient the recommendation of loop/ICD implantation.  Pt further discussed this with Dr. Antonio Rodrigues/Jw Barlow at his EP appt today.  Please see his note for detailed discussion and plan.  Pt educated that any syncopal episode is considered a medical emergency requiring ER visit/evaluaion.    Madison Sorenson MD  Section Head - Advanced Heart Failure, Transplantation and Mechanical Circulatory Support  Director - Adult Congenital and Cardiovascular Genetics Center  Associate Professor of Medicine, [facility]      Please do not hesitate to contact me if you have any questions/concerns.     Sincerely,     Madison Sorenson MD

## 2020-07-03 NOTE — LETTER
July 22, 2020      TO: Uzma Clemons  [address] [facility] [address]         Dear Uzma,    You are scheduled for a Loop Recorder Implant.    It is recommended that you undergo a COVID-19 PCR swab test 48-72 hours before procedure. You will receive a phone call with more information.     Follow these instructions:     1. You should arrive at the [facility] waiting room, located in the [facility], at _7am on July 30, 2020__. The address is: [address] [place] [address].     2. Do not eat or drink for 6 hours prior to arrival.     3. The morning of this procedure, you may take your normal AM medications with a sip of water.     4. You will discharge the same day. You may drive yourself.    5. Use the antibacterial wipes the night before and morning of your procedure.   Follow the included instructions. This is to reduce surface bacteria to help prevent infection.         Post-Procedure Instructions  Wound Care  1. Check for signs of infection each day.  These include increased redness, swelling, drainage or a fever over 101 F (38.3 C).  Call us immediately if you see any of these signs.  2. You may shower 24 hours after the procedure.  3. Do not submerge the incision (in a bath tub, hot tub, or swimming pool) until fully healed.  4. Do not apply powder or lotion to the incision for 14 days. You may shower in the morning.  Pain  1. You may have mild pain for 3 to 5 days.  Take acetaminophen (Tylenol) or ibuprofen (Advil) for the pain.  Call us if the pain is severe or lasts more than 5 days.  Activity  1. After 24 hours, slowly return to your normal activities.   2. Avoid anything that may cause rough contact or a hard hit to your chest.  This includes football, hockey, and other contact sports.  Follow Up Appointments  Follow Up Appointments Date & Time   7-10 day incision check with device clinic     3 month follow up with RUI[facility] NP Nov 4, 2020  10 am - video visit       If your question concerns  "the schedule/appointment times, contact:  [facility]   [phone]      Device Clinic (Pacemakers, Defibrillators, Loop Recorders)  [phone]                              Preparing the Skin Before Surgery  Preparing or \"prepping\" skin before surgery can reduce the risk of infection at the surgical site. To make the process easier, this facility has chosen disposable cloths moistened with rinse-free 2% Chlorhexidine Gluconate antiseptic solution designed to reduce the bacteria on the skin. The steps below outline the prepping process and should be carefully followed.    Prep the skin at the following time(s):    - If you wish to shower, bathe or shampoo your hair, do so the night before and prep your skin afterwards for the first time using one package of cloths.    - Skin must be prepped the morning of the procedure using the second package of cloths.        Prep The Night Before Procedure    Do not allow this product to come in contact with your eyes, ears, mouth or mucous membranes.     Reach into one of the packages, remove the two cloths with the foam whitten and place onto a clean table.    Use one clean cloth to prep each are of the body. One cloth for #1 - neck & chest. One cloth for #2 & #3 - both arms, shoulder to fingertips including armpits. Wipe each area in a back and forth motion for 3 minutes. Be sure to wipe each area thoroughly.    Do not rinse or apply any lotions, moisturizer or make up after prepping.    Discard cloths in trash can.     Allow your skin to air dry. Dress in clean clothes/sleepwear      Prepping your skin on the morning of surgery:    Do not shower, bathe or shampoo hair.    Open a new package and follow the instructions listed above.                     "

## 2020-07-06 ENCOUNTER — HOSPITAL ENCOUNTER (OUTPATIENT)
Facility: CLINIC | Age: 55
End: 2020-07-06
Attending: INTERNAL MEDICINE | Admitting: INTERNAL MEDICINE
Payer: COMMERCIAL

## 2020-07-06 DIAGNOSIS — I48.0 PAROXYSMAL ATRIAL FIBRILLATION (H): ICD-10-CM

## 2020-07-06 DIAGNOSIS — I47.10 SUPRAVENTRICULAR TACHYCARDIA (H): ICD-10-CM

## 2020-07-06 RX ORDER — SODIUM CHLORIDE 9 MG/ML
INJECTION, SOLUTION INTRAVENOUS CONTINUOUS
Status: CANCELLED | OUTPATIENT
Start: 2020-07-06

## 2020-07-06 RX ORDER — CEFAZOLIN SODIUM 2 G/50ML
2 SOLUTION INTRAVENOUS
Status: CANCELLED | OUTPATIENT
Start: 2020-07-06

## 2020-07-10 ENCOUNTER — TELEPHONE (OUTPATIENT)
Dept: CARDIOLOGY | Facility: CLINIC | Age: 55
End: 2020-07-10

## 2020-07-21 ENCOUNTER — TELEPHONE (OUTPATIENT)
Dept: CARDIOLOGY | Facility: CLINIC | Age: 55
End: 2020-07-21

## 2020-07-21 DIAGNOSIS — Z11.59 ENCOUNTER FOR SCREENING FOR OTHER VIRAL DISEASES: Primary | ICD-10-CM

## 2020-07-21 NOTE — TELEPHONE ENCOUNTER
EP Scheduling called the patient X2 to schedule ILR implant with Dr. Barlow. The number [phone] was left for the patient to return the call and schedule the procedure.    Tory Dawn  Periop Electrophysiology   [phone]

## 2020-07-21 NOTE — TELEPHONE ENCOUNTER
----- Message from Alysa Dawn sent at 7/16/2020  2:30 PM CDT -----  Regarding: FW: Pt agreed to have ILR  Lvm 7/16  ----- Message -----  From: Tirso Cheney RN  Sent: 7/13/2020   8:47 AM CDT  To: Alysa Dawn  Subject: Pt agreed to have ILR                            Hi,    I don't remember if I sent you a message or not.  Cain got back to me, patient agreed to have ILR placed.    Can you reach out to her to schedule?  Thanks!    A

## 2020-07-22 ENCOUNTER — TELEPHONE (OUTPATIENT)
Dept: CARDIOLOGY | Facility: CLINIC | Age: 55
End: 2020-07-22

## 2020-07-28 ENCOUNTER — TELEPHONE (OUTPATIENT)
Dept: CARDIOLOGY | Facility: CLINIC | Age: 55
End: 2020-07-28

## 2020-07-28 NOTE — TELEPHONE ENCOUNTER
[facility] Call Center    Phone Message    May a detailed message be left on voicemail: no     Reason for Call: Other: Per Yulissa in Financial Counseling, Uzma's procedure on 7/30/2020 has to be cancelled. Insurance has denied this due to not being a medical necessity.     Action Taken: Other: Cardiology    Travel Screening: Not Applicable

## 2020-07-29 NOTE — TELEPHONE ENCOUNTER
Called patient to confirm she is NOT planning on coming to procedure tomorrow. LVM with callback information.

## 2020-08-05 ENCOUNTER — TELEPHONE (OUTPATIENT)
Dept: CARDIOLOGY | Facility: CLINIC | Age: 55
End: 2020-08-05

## 2020-08-05 NOTE — LETTER
August 13, 2020      TO: Uzma Clemons  [address] [facility] [address]         Dear Uzma,    Our records indicate that it is time for you to schedule your return visit with the [facility] in [facility]      To make your appointment, please call: [phone], Monday - Friday, 8 A.M. - 4:30 P.M (Central Time Zone).    Please check with your insurance company about your benefits.  Some insurance plans provide different coverage levels for services at [facility] hospital-based clinics.     We look forward to hearing from you.    Sincerely,    Lubna Gerard  Clinic Coordinator  [facility]  Office: [phone]  Fax: [phone]

## 2020-08-14 DIAGNOSIS — I48.0 PAROXYSMAL ATRIAL FIBRILLATION (H): ICD-10-CM

## 2020-08-14 DIAGNOSIS — I42.8 ARRHYTHMOGENIC LEFT VENTRICULAR DYSPLASIA (H): Primary | ICD-10-CM

## 2020-08-14 DIAGNOSIS — I47.10 SUPRAVENTRICULAR TACHYCARDIA (H): ICD-10-CM

## 2020-08-18 NOTE — TELEPHONE ENCOUNTER
METOPROLOL SUCCINATE ER TABS 50MG    Last Written Prescription Date: 7/3/2020  Last Fill Quantity: 270,   # refills: 3  Last Office Visit : 7/3/2020  Future Office visit:  None    Routing refill request to provider for review/approval because:  Clarification of orders.     Is Pt taking 3 Tabs daily? Or 1 Tab Daily?   Please send new updated order to updated pharmacy for Pt care.   Thank you      Heather Hairston RN  Central Triage Red Flags/Med Refills

## 2020-08-21 RX ORDER — METOPROLOL SUCCINATE 50 MG/1
50 TABLET, EXTENDED RELEASE ORAL DAILY
Qty: 90 TABLET | Refills: 3 | Status: SHIPPED | OUTPATIENT
Start: 2020-08-21 | End: 2020-08-25

## 2020-08-21 NOTE — TELEPHONE ENCOUNTER
"Per 7/3/2020 Dr. Sorenson note, \" Will begin patient on Metoprolol XL 25 mg daily.  After one month will have patient increase Metoprolol XL to 50 mg daily.\" Rx for 50 mg daily tablets sent to pharmacy.  "

## 2020-08-25 ENCOUNTER — TELEPHONE (OUTPATIENT)
Dept: CARDIOLOGY | Facility: CLINIC | Age: 55
End: 2020-08-25

## 2020-08-25 DIAGNOSIS — I42.8 ARRHYTHMOGENIC LEFT VENTRICULAR DYSPLASIA (H): ICD-10-CM

## 2020-08-25 DIAGNOSIS — I48.0 PAROXYSMAL ATRIAL FIBRILLATION (H): ICD-10-CM

## 2020-08-25 DIAGNOSIS — I47.10 SUPRAVENTRICULAR TACHYCARDIA (H): ICD-10-CM

## 2020-08-25 NOTE — TELEPHONE ENCOUNTER
Health Call Center    Phone Message    May a detailed message be left on voicemail: yes     Reason for Call: Medication Question or concern regarding medication   Prescription Clarification  Name of Medication: metoprolol succinate ER (TOPROL-XL) 50 MG 24 hr tablet   Prescribing Provider: Dr. Sorenson   Pharmacy: [facility] - [place], [place] - [address] [place] [address]   What on the order needs clarification? An [facility] called represenative and said their pharmacist has a drug therapy question for Dr. Sorenson's care team. The representative did not go into detail, but gave the reference number of [id number] for a care team member to call back and discuss. The representative said the care team should call [phone]. Please give them a call back at your earliest convenience.          Action Taken: Message routed to:  [facility] ([facility]):  Cardio    Travel Screening: Not Applicable

## 2020-08-25 NOTE — TELEPHONE ENCOUNTER
Call placed to [facility].  Representative states that they received a prescription for Metoprolol XL 25 mg daily in addition to the Metoprolol Tartrate 50 mg in the AM and 25 mg in the PM and were wondering which prescription is accurate.  Asked that they cancel the Metoprolol XL until it is clarified with the MD.    Tirso Cheney, RN  Cardiology RN Care Coordinator  [phone]

## 2020-09-18 ENCOUNTER — APPOINTMENT (OUTPATIENT)
Dept: URBAN - METROPOLITAN AREA CLINIC 256 | Age: 55
Setting detail: DERMATOLOGY
End: 2020-09-18

## 2020-11-11 ENCOUNTER — APPOINTMENT (OUTPATIENT)
Dept: URBAN - METROPOLITAN AREA CLINIC 253 | Age: 55
Setting detail: DERMATOLOGY
End: 2020-11-16

## 2020-11-11 VITALS — HEIGHT: 68 IN | RESPIRATION RATE: 14 BRPM | WEIGHT: 164 LBS

## 2020-11-11 DIAGNOSIS — B35.3 TINEA PEDIS: ICD-10-CM

## 2020-11-11 DIAGNOSIS — L82.0 INFLAMED SEBORRHEIC KERATOSIS: ICD-10-CM

## 2020-11-11 DIAGNOSIS — D22 MELANOCYTIC NEVI: ICD-10-CM

## 2020-11-11 DIAGNOSIS — Z71.89 OTHER SPECIFIED COUNSELING: ICD-10-CM

## 2020-11-11 DIAGNOSIS — B35.1 TINEA UNGUIUM: ICD-10-CM

## 2020-11-11 DIAGNOSIS — D18.0 HEMANGIOMA: ICD-10-CM

## 2020-11-11 DIAGNOSIS — L81.4 OTHER MELANIN HYPERPIGMENTATION: ICD-10-CM

## 2020-11-11 DIAGNOSIS — L82.1 OTHER SEBORRHEIC KERATOSIS: ICD-10-CM

## 2020-11-11 PROBLEM — D18.01 HEMANGIOMA OF SKIN AND SUBCUTANEOUS TISSUE: Status: ACTIVE | Noted: 2020-11-11

## 2020-11-11 PROBLEM — D22.5 MELANOCYTIC NEVI OF TRUNK: Status: ACTIVE | Noted: 2020-11-11

## 2020-11-11 PROCEDURE — OTHER VENIPUNCTURE: OTHER

## 2020-11-11 PROCEDURE — OTHER ORDER TESTS: OTHER

## 2020-11-11 PROCEDURE — OTHER COUNSELING: OTHER

## 2020-11-11 PROCEDURE — 17110 DESTRUCT B9 LESION 1-14: CPT

## 2020-11-11 PROCEDURE — 99214 OFFICE O/P EST MOD 30 MIN: CPT | Mod: 25

## 2020-11-11 PROCEDURE — 36415 COLL VENOUS BLD VENIPUNCTURE: CPT

## 2020-11-11 PROCEDURE — OTHER LIQUID NITROGEN: OTHER

## 2020-11-11 PROCEDURE — OTHER PRESCRIPTION: OTHER

## 2020-11-11 RX ORDER — TERBINAFINE HYDROCHLORIDE 250 MG/1
250 TABLET ORAL QD
Qty: 90 | Refills: 0 | Status: ERX | COMMUNITY
Start: 2020-11-11

## 2020-11-11 ASSESSMENT — LOCATION ZONE DERM
LOCATION ZONE: TRUNK
LOCATION ZONE: ARM
LOCATION ZONE: TOENAIL
LOCATION ZONE: FEET

## 2020-11-11 ASSESSMENT — LOCATION DETAILED DESCRIPTION DERM
LOCATION DETAILED: LEFT DORSAL FOOT
LOCATION DETAILED: LEFT MEDIAL UPPER BACK
LOCATION DETAILED: LEFT GREAT TOENAIL
LOCATION DETAILED: RIGHT ANTERIOR SHOULDER
LOCATION DETAILED: RIGHT SUPERIOR MEDIAL UPPER BACK
LOCATION DETAILED: RIGHT DORSAL FOOT
LOCATION DETAILED: RIGHT INFERIOR MEDIAL UPPER BACK
LOCATION DETAILED: SUPERIOR THORACIC SPINE

## 2020-11-11 ASSESSMENT — LOCATION SIMPLE DESCRIPTION DERM
LOCATION SIMPLE: RIGHT SHOULDER
LOCATION SIMPLE: LEFT FOOT
LOCATION SIMPLE: LEFT GREAT TOE
LOCATION SIMPLE: UPPER BACK
LOCATION SIMPLE: RIGHT UPPER BACK
LOCATION SIMPLE: RIGHT FOOT
LOCATION SIMPLE: LEFT UPPER BACK

## 2020-11-11 NOTE — PROCEDURE: COUNSELING
Detail Level: Generalized
Patient Specific Counseling (Will Not Stick From Patient To Patient): Discussed and recommended Lamisil.
Detail Level: Simple
Detail Level: Zone

## 2020-11-11 NOTE — PROCEDURE: LIQUID NITROGEN
Medical Necessity Clause: This procedure was medically necessary because the lesions that were treated were:
Detail Level: Detailed
Consent: The patient's consent was obtained including but not limited to risks of crusting, scabbing, blistering, scarring, darker or lighter pigmentary change, recurrence, incomplete removal and infection.
Add 52 Modifier (Optional): no
Render Post-Care Instructions In Note?: yes
Post-Care Instructions: I reviewed with the patient in detail post-care instructions. Patient is to wear sunprotection, and avoid picking at any of the treated lesions. Pt may apply Vaseline to crusted or scabbing areas.
Duration Of Freeze Thaw-Cycle (Seconds): 3
Number Of Freeze-Thaw Cycles: 2 freeze-thaw cycles
Medical Necessity Information: It is in your best interest to select a reason for this procedure from the list below. All of these items fulfill various CMS LCD requirements except the new and changing color options.

## 2020-12-09 ENCOUNTER — APPOINTMENT (OUTPATIENT)
Dept: URBAN - METROPOLITAN AREA CLINIC 253 | Age: 55
Setting detail: DERMATOLOGY
End: 2020-12-09

## 2020-12-09 DIAGNOSIS — B35.1 TINEA UNGUIUM: ICD-10-CM

## 2020-12-09 PROCEDURE — OTHER ORDER TESTS: OTHER

## 2020-12-09 PROCEDURE — OTHER VENIPUNCTURE: OTHER

## 2020-12-09 PROCEDURE — 36415 COLL VENOUS BLD VENIPUNCTURE: CPT

## 2021-01-15 ENCOUNTER — HEALTH MAINTENANCE LETTER (OUTPATIENT)
Age: 56
End: 2021-01-15

## 2021-02-09 ENCOUNTER — TELEPHONE (OUTPATIENT)
Dept: CARDIOLOGY | Facility: CLINIC | Age: 56
End: 2021-02-09

## 2021-02-09 DIAGNOSIS — I42.8 ARRHYTHMOGENIC LEFT VENTRICULAR DYSPLASIA (H): ICD-10-CM

## 2021-02-09 RX ORDER — METOPROLOL TARTRATE 25 MG/1
TABLET, FILM COATED ORAL
Qty: 270 TABLET | Refills: 1 | Status: SHIPPED | OUTPATIENT
Start: 2021-02-09 | End: 2021-07-23

## 2021-02-09 NOTE — TELEPHONE ENCOUNTER
[facility] Call Center    Phone Message    May a detailed message be left on voicemail: yes     Reason for Call: Medication Refill Request    Has the patient contacted the pharmacy for the refill? Yes   Name of medication being requested: metoprolol tartrate (LOPRESSOR) 25 MG tablet  Provider who prescribed the medication: Dr Sorenson  Pharmacy: [facility] - [address]  Date medication is needed: ASAP         Action Taken: Message routed to:  [facility] ([facility] CARDIO     Travel Screening: Not Applicable

## 2021-05-15 ENCOUNTER — HEALTH MAINTENANCE LETTER (OUTPATIENT)
Age: 56
End: 2021-05-15

## 2021-07-20 ENCOUNTER — CARE COORDINATION (OUTPATIENT)
Dept: CARDIOLOGY | Facility: CLINIC | Age: 56
End: 2021-07-20

## 2021-07-20 NOTE — PROGRESS NOTES
Date: 2021    Time of Call: 10:39 AM     Diagnosis:  familial ARVC     [ TORB ] Ordering provider: Dr. Sorenson  Order: genetic testing, overdue for follow up in [facility] clinic with Zio and stair test prior     Order received by: ZACH Verduzco     Follow-up/additional notes: Discussed Uzma's case in 21 [facility] conference. Given son Gustabo's recent genetic testing, recommend Tanisha have genetic testing and any living siblings have genetic testing to danilo quantify VUS and connection to ARVC. Called Tanisha and LVM with callback information. Will share Luba MUNSON contact info for genetic [phone] (scheduling)  [phone] (fax)  [email or web address]

## 2021-07-21 DIAGNOSIS — I42.8 ARRHYTHMOGENIC LEFT VENTRICULAR DYSPLASIA (H): ICD-10-CM

## 2021-07-23 NOTE — TELEPHONE ENCOUNTER
METOPROLOL TARTRATE TABS 25MG   Last Written Prescription Date:  2/9/2021  Last Fill Quantity: 270,   # refills: 1  Last Office Visit : 7/3/2021  Future Office visit:  None    Routing refill request to provider for review/approval because:  Would the Provider like an updated B/P on file for this medication?  Refer to Clinic for review     BP Readings from Last 3 Encounters:   10/30/17 127/84   08/18/17 (!) 145/95   11/28/16 106/64       Heather Hairston RN  Central Triage Red Flags/Med Refills

## 2021-07-27 RX ORDER — METOPROLOL TARTRATE 25 MG/1
TABLET, FILM COATED ORAL
Qty: 270 TABLET | Refills: 0 | Status: SHIPPED | OUTPATIENT
Start: 2021-07-27 | End: 2021-10-20

## 2021-07-28 DIAGNOSIS — I48.0 PAROXYSMAL ATRIAL FIBRILLATION (H): ICD-10-CM

## 2021-07-28 DIAGNOSIS — I42.8 ARRHYTHMOGENIC LEFT VENTRICULAR DYSPLASIA (H): Primary | ICD-10-CM

## 2021-07-28 DIAGNOSIS — I47.10 SUPRAVENTRICULAR TACHYCARDIA (H): ICD-10-CM

## 2021-08-05 ENCOUNTER — VIRTUAL VISIT (OUTPATIENT)
Dept: PEDIATRIC CARDIOLOGY | Facility: CLINIC | Age: 56
End: 2021-08-05
Attending: GENETIC COUNSELOR, MS
Payer: COMMERCIAL

## 2021-08-05 DIAGNOSIS — Z84.81 FAMILY HISTORY OF GENE MUTATION: Primary | ICD-10-CM

## 2021-08-05 PROCEDURE — 96040 HC GENETIC COUNSELING, EACH 30 MINUTES: CPT | Mod: GT | Performed by: GENETIC COUNSELOR, MS

## 2021-08-05 NOTE — PROGRESS NOTES
"Here is a copy of the progress note from your recent genetic counseling visit through the [facility] on Date: 2021.  Due to Covid-19 pandemic, this appointment was moved to a virtual visit.    PROGRESS NOTE:Uzma was seen again for genetic counseling due to the family history of myocarditis and possible arrhythmogenic cardiomyopathy in her son and sudden cardiac death in her family. I had the opportunity to talk with Uzma today to discuss the genetic component of ACM, the variant of unknown significance (VUS) found in her son, and testing options available to her.     MEDICAL HISTORY:Uzma has a history of dizziness and near fainting episodes.  She was diagnosed with atrial fibrillation and flutter.  She had an ablation and has experienced less symptoms since that time.  However, she reports a couple episodes in the last month of heart pounding and agitation that lasts a couple of days.      FAMILY HISTORY:A detailed family history was obtained during today's consult.  Family history was significant for the following cardiac history:    Татьяна's son Gustabo experienced chest pains, back tightness, and fever in 2017.  MRI listed differential diagnosis as viral myocarditis versus a\" hot phase\" of left dominant arrhythmogenic cardiomyopathy (LDAC.) Gustabo has been followed in our [facility] clinic for years.  Recent genetic testing revealed that he carries a variant of unknown significance in the DSP gene.     Татьяна's brother  suddenly at 49 years of age.  Autopsy revealed cardiomegaly and moderate fat investment on heart surface.      Another brother  at 54 years.  Autopsy, not yet reviewed by us, stated brain aneurysm as cause of death.  They are unaware of any heart findings.      A third brother has LV hypertrophy, reduced LVEF, and some LA enlargement. She also reports that he has been using meth since the     Татьяна's mother also has a history of " arrhythmia since 63 years.  She has reported palpitations and dizziness.     Татьяна's father  around 55 years from suspected cancer.    There is no additional history of cardiomyopathy, arrhythmias, heart attacks, fainting, sudden cardiac death, genetic conditions, or birth defects. (A copy of pedigree may be found under media tab).    DISCUSSION:   Reviewed uncertainty of diagnosis in the family. Explained that Gustabo's test results also raise suspicion of ACM.  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  A variant of unknown significance means that there is a genetic change in which we do not have enough information to determine if it is disease causing or not. Labs review published data, functional studies, presences in population databases, similarity to normal sequence, and computer prediction models.    The DSP gene is known to be associated with arrhythmogenic cardiomyopathy (ACM), particularly with left ventricular involvement. This particular variant occurs in a position that is well conserved (meaning that it is not used to changes) and creates an amino acid with dissimilar properties.  However, computer models predict that this change will be tolerated. Although suspicion is raisied, there is not enough current information to be certain if this variant alone can cause to disease or not.      The lab that performed Gustabo's testing, [facility], offers the option of Family Studies Research to help gather data to learn more about these variants and their role in disease.  Uzma has not yet been approved for testing in this free program.  If she gets approved, she will move forward thru this testing option.  Results will be reported as an addendum to Gustabo's test and can take 2-3 months.  If she is not approved, she can pursue testing and could pay $250 for the self pay option.  Results would be available in 2-4 weeks and would be reported  in her name.     All questions answered at this time.     PLAN:Татьяна elected to proceed with genetic testing as part of the Family Studies Program, if that option is available.  If not, she would still like to proceed with testing to help better understand the inheritance of the DSP vus and add to the scientific data for this variant.  Requisition and consent forms were completed and signed.  DNA will be collected via saliva sample and sent to [facility]. I will contact her when I know if she qualifies for family studies and again when results are available.    TOTAL TIME SPENT IN COUNSELIN minutes    Luba Michelle MS, [facility]  Licensed, Certified Genetic Counselor  [facility]

## 2021-08-09 ENCOUNTER — ALLIED HEALTH/NURSE VISIT (OUTPATIENT)
Dept: CARDIOLOGY | Facility: CLINIC | Age: 56
End: 2021-08-09
Attending: INTERNAL MEDICINE
Payer: COMMERCIAL

## 2021-08-09 DIAGNOSIS — I47.10 SUPRAVENTRICULAR TACHYCARDIA (H): ICD-10-CM

## 2021-08-09 DIAGNOSIS — I42.8 ARRHYTHMOGENIC LEFT VENTRICULAR DYSPLASIA (H): ICD-10-CM

## 2021-08-09 DIAGNOSIS — I48.0 PAROXYSMAL ATRIAL FIBRILLATION (H): ICD-10-CM

## 2021-09-04 ENCOUNTER — HEALTH MAINTENANCE LETTER (OUTPATIENT)
Age: 56
End: 2021-09-04

## 2021-09-24 ENCOUNTER — TELEPHONE (OUTPATIENT)
Dept: CARDIOLOGY | Facility: CLINIC | Age: 56
End: 2021-09-24

## 2021-09-24 NOTE — LETTER
9/24/2021        RE: Uzma Clemons  [address] [facility] [address]        I was called by [employer] about Shannon Martinez. Patient had 100% A fib burden. She had one episode of rapid AF at 188 bpm that lasted for 60 seconds.    I will forward the following information to Dr. Sorenson.        Sincerely,        Edgardo López MD

## 2021-09-24 NOTE — TELEPHONE ENCOUNTER
I was called by [employer] about Shannon Martinez. Patient had 100% A fib burden. She had one episode of rapid AF at 188 bpm that lasted for 60 seconds.    I will forward the following information to Dr. Sorenson.

## 2021-10-01 ENCOUNTER — OFFICE VISIT (OUTPATIENT)
Dept: CARDIOLOGY | Facility: CLINIC | Age: 56
End: 2021-10-01
Attending: INTERNAL MEDICINE
Payer: COMMERCIAL

## 2021-10-01 VITALS
HEIGHT: 68 IN | SYSTOLIC BLOOD PRESSURE: 123 MMHG | DIASTOLIC BLOOD PRESSURE: 75 MMHG | HEART RATE: 88 BPM | OXYGEN SATURATION: 97 % | WEIGHT: 167 LBS | BODY MASS INDEX: 25.31 KG/M2

## 2021-10-01 DIAGNOSIS — I47.29 NSVT (NONSUSTAINED VENTRICULAR TACHYCARDIA) (H): Primary | ICD-10-CM

## 2021-10-01 DIAGNOSIS — I47.10 SUPRAVENTRICULAR TACHYCARDIA (H): ICD-10-CM

## 2021-10-01 DIAGNOSIS — I42.8 ARRHYTHMOGENIC RIGHT VENTRICULAR CARDIOMYOPATHY (H): Primary | ICD-10-CM

## 2021-10-01 DIAGNOSIS — I42.8 ARRHYTHMOGENIC LEFT VENTRICULAR DYSPLASIA (H): ICD-10-CM

## 2021-10-01 DIAGNOSIS — I48.0 PAROXYSMAL ATRIAL FIBRILLATION (H): ICD-10-CM

## 2021-10-01 LAB
ATRIAL RATE - MUSE: 49 BPM
DIASTOLIC BLOOD PRESSURE - MUSE: NORMAL MMHG
INTERPRETATION ECG - MUSE: NORMAL
P AXIS - MUSE: 65 DEGREES
PR INTERVAL - MUSE: 138 MS
QRS DURATION - MUSE: 92 MS
QT - MUSE: 456 MS
QTC - MUSE: 411 MS
R AXIS - MUSE: 3 DEGREES
SYSTOLIC BLOOD PRESSURE - MUSE: NORMAL MMHG
T AXIS - MUSE: 71 DEGREES
VENTRICULAR RATE- MUSE: 49 BPM

## 2021-10-01 PROCEDURE — 99213 OFFICE O/P EST LOW 20 MIN: CPT | Performed by: INTERNAL MEDICINE

## 2021-10-01 PROCEDURE — G0463 HOSPITAL OUTPT CLINIC VISIT: HCPCS | Mod: 25

## 2021-10-01 PROCEDURE — 99214 OFFICE O/P EST MOD 30 MIN: CPT | Mod: 25 | Performed by: INTERNAL MEDICINE

## 2021-10-01 PROCEDURE — 93005 ELECTROCARDIOGRAM TRACING: CPT

## 2021-10-01 RX ORDER — LACTOBACILLUS RHAMNOSUS GG 10B CELL
1 CAPSULE ORAL 2 TIMES DAILY
COMMUNITY

## 2021-10-01 RX ORDER — ANTIARTHRITIC COMBINATION NO.2 900 MG
10 TABLET ORAL DAILY
COMMUNITY

## 2021-10-01 ASSESSMENT — MIFFLIN-ST. JEOR: SCORE: 1384.63

## 2021-10-01 ASSESSMENT — PAIN SCALES - GENERAL: PAINLEVEL: NO PAIN (0)

## 2021-10-01 NOTE — NURSING NOTE
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      Vitals were taken and medications reconciled. 2 flight of stairs test performed.    Abran Zamarripa, EMT  12:49 PM

## 2021-10-01 NOTE — PATIENT INSTRUCTIONS
You were seen today in the Adult [facility] Clinic at the [facility].    Cardiology Providers you saw during your visit:  Madison Sorenson MD and Jw Barlow MD    Diagnosis:  Possible ARVC    Results:  Madison Sorenson MD and Jw Barlow MD reviewed the results of your Zio patch testing today in clinic.    Recommendations:    1. Continue to eat a heart healthy, low salt diet.  2. Continue to get 20-30 minutes of aerobic activity, 4-5 days per week.  Examples of aerobic activity include walking, running, swimming, cycling, etc.  3. Continue to observe good oral hygiene, with regular dental visits.  4. No changes today.  5. Refrain from the use of stimulants. Limit alcohol intake to  3-4 drinks/week  6. Report to the ER for any syncopal event.    SBE prophylaxis:   Yes____  No_X___    Lifelong Bacterial Endocarditis Prophylaxis:  YES____  NO____    If YES is checked, follow the recommendations outlined below:  1. Take antibiotic(s) prior to recommended dental procedures and procedures on the respiratory tract or with infected skin, muscle or bones. SBE prophylaxis is not needed for routine GI and  procedures (ie. Colonoscopy or vaginal delivery)  2. Observe good oral hygiene daily, as advised by your dentist. Get regular professional dental care.  3. Keep cuts clean.  4. Infections should be treated promptly.  5. Symptoms of Infective Endocarditis could include: fever lasting more than 4-5 days or a recurrent fever that initially resolves but returns within 1-2 days)      Exercise restrictions:   Yes__X__  No____         If yes, list restrictions:  Must be allowed to rest if fatigued or SOB    1. Restrict from endurance or competitive athletics  2. Should be able to comfortably speak 5-7 words during all times with exercise  3. Should keep HR <85% predicted maximum HR- 140 BPM      Work restrictions:  Yes____  No_X___         If yes, list restrictions:    FASTING  CHOLESTEROL was checked in the last 5 years YES_X__  NO___ (2020)  Continue to eat a heart healthy, low salt diet.         ____ Fasting lipid panel order today         ____ No changes in medications          ____ I recommend the following changes in your cholesterol medications.:          ____ Please follow up for cholesterol screening at your primary care physician      Follow-up:  Follow up with Dr. Sorenson and Dr. Barlow in one year with echo and 14-day Zio patch prior.    If you have questions or concerns please contact us at:    Luba Leonard, MSN, RN, CNL    Lubna Gerard (Scheduling)  Nurse Care Coordinator     Clinic   Adult Congenital and CV Genetics   Adult Congenital and CV Genetic  [facility]   [facility]  (P) [phone]     (P) [phone]  [email or web address]   (F) [phone]        For after hours urgent needs, call [phone] and ask to speak to the Adult Congenital Physician on call.  Mention Job Code 0401.    For emergencies call 911.    [facility]  [facility]   [facility]  Mail Code [id number]  [address] [place]  [address]

## 2021-10-01 NOTE — PATIENT INSTRUCTIONS
You were seen today in the Adult Congenital and Cardiovascular Genetics Clinic at the [facility].    Cardiology Providers you saw during your visit:  Madison Sorenson MD and Jw Barlow MD    Diagnosis:  Possible ARVC    Results:  Madison Sorenson MD and Jw Barlow MD reviewed the results of your Zio patch testing today in clinic.    Recommendations:    1. Continue to eat a heart healthy, low salt diet.  2. Continue to get 20-30 minutes of aerobic activity, 4-5 days per week.  Examples of aerobic activity include walking, running, swimming, cycling, etc.  3. Continue to observe good oral hygiene, with regular dental visits.  4. No changes today.  5. Refrain from the use of stimulants. Limit alcohol intake to  3-4 drinks/week  6. Report to the ER for any syncopal event.    SBE prophylaxis:   Yes____  No_X___    Lifelong Bacterial Endocarditis Prophylaxis:  YES____  NO____    If YES is checked, follow the recommendations outlined below:  1. Take antibiotic(s) prior to recommended dental procedures and procedures on the respiratory tract or with infected skin, muscle or bones. SBE prophylaxis is not needed for routine GI and  procedures (ie. Colonoscopy or vaginal delivery)  2. Observe good oral hygiene daily, as advised by your dentist. Get regular professional dental care.  3. Keep cuts clean.  4. Infections should be treated promptly.  5. Symptoms of Infective Endocarditis could include: fever lasting more than 4-5 days or a recurrent fever that initially resolves but returns within 1-2 days)      Exercise restrictions:   Yes__X__  No____         If yes, list restrictions:  Must be allowed to rest if fatigued or SOB    1. Restrict from endurance or competitive athletics  2. Should be able to comfortably speak 5-7 words during all times with exercise  3. Should keep HR <85% predicted maximum HR- 140 BPM      Work restrictions:  Yes____  No_X___         If yes, list restrictions:    FASTING  CHOLESTEROL was checked in the last 5 years YES_X__  NO___ (2020)  Continue to eat a heart healthy, low salt diet.         ____ Fasting lipid panel order today         ____ No changes in medications          ____ I recommend the following changes in your cholesterol medications.:          ____ Please follow up for cholesterol screening at your primary care physician      Follow-up:  Follow up with Dr. Sorenson and Dr. Barlow in one year with echo and 14-day Zio patch prior.    If you have questions or concerns please contact us at:    Luba Leonard, MSN, RN, CNL    Lubna Gerard (Scheduling)  Nurse Care Coordinator     Clinic   Adult Congenital and CV Genetics   Adult Congenital and CV Genetic  [facility]   [facility]  (P) [phone]     (P) [phone]  [email or web address]   (F) [phone]        For after hours urgent needs, call [phone] and ask to speak to the Adult Congenital Physician on call.  Mention Job Code 0401.    For emergencies call 911.    [facility]  [facility]   [facility]  Mail Code [id number]  [address] [place]  [address]

## 2021-10-01 NOTE — LETTER
"10/1/2021      RE: Uzma Clemons  [address] [facility] [address]       Dear Colleague,    Thank you for the opportunity to participate in the care of your patient, Uzma Clemons, at the [facility] [place] at [facility]. Please see a copy of my visit note below.    HPI:  56 female with arrhythmogenic cardiomyopathy with h/o atrial arrhythmias s/p ablation presents for ongoing evaluation and management. Pt reports that since last visit she has been fairly well.  She continues to have some episodic episodes of atrial fib but these have not changed significantly from prior.  She continues to have some occasional atypical chest pain which remains without with any other associated symptoms and is not consistently associated with or worsened by exertion.  She denies any sob/page, orthopnea, pnd, syncope/presyncope, cole or change in her exercise tolerance.  She denies any problems with her medications and reports compliance.      PAST MEDICAL HISTORY:  Past Medical History:   Diagnosis Date     ADD (attention deficit disorder)      Atrial flutter (H)     ablation at [facility] 2013     Depression      Esophageal reflux      Other forms of migraine, without mention of intractable migraine without mention of status migrainosus      Paroxysmal atrial fibrillation (H)      Paroxysmal supraventricular tachycardia (H)      Unspecified asthma(493.90)        FAMILY HISTORY:  Family History   Problem Relation Age of Onset     Arrhythmia Mother         takes Metoprol      Cancer Father         lung-smoker     Myocardial Infarction Paternal Grandfather      Cancer Brother         tish passed, unsure if it was heart related     Bronchitis Brother      Татьяна's son Gustabo experienced chest pains, back tightness, and fever in April 2017.  MRI listed differential diagnosis as viral myocarditis versus a\" hot phase\" of left dominant arrhythmogenic " [facility] Gustabo has been followed in our [facility] clinic for years.  Recent genetic testing revealed that he carries a variant of unknown significance in the DSP gene (c.8527 A>C.     Татьяна's brother  suddenly at 49 years of age.  Autopsy revealed cardiomegaly and moderate fat investment on heart surface.      Another brother  at 54 years.  Autopsy, not yet reviewed by us, stated brain aneurysm as cause of death.  They are unaware of any heart findings.      A third brother has LV hypertrophy, reduced LVEF, and some LA enlargement. She also reports that he has been using meth since the     Татьяна's mother also has a history of arrhythmia since 63 years.  She has reported palpitations and dizziness.     Татьяна's father  around 55 years from suspected cancer.    There is no additional history of cardiomyopathy, arrhythmias, heart attacks, fainting, sudden cardiac death, genetic conditions, or birth defects. (A copy of pedigree may be found under media tab).      SOCIAL HISTORY:  Social History     Socioeconomic History     Marital status:      Spouse name: None     Number of children: None     Years of education: None     Highest education level: None   Occupational History     None   Social Needs     Financial resource strain: None     Food insecurity     Worry: None     Inability: None     Transportation needs     Medical: None     Non-medical: None   Tobacco Use     Smoking status: Never Smoker     Smokeless tobacco: Never Used   Substance and Sexual Activity     Alcohol use: Yes     Comment: 3-5 per week     Drug use: No     Sexual activity: Yes     Partners: Male     Birth control/protection: Surgical     Comment: vasectomy   Lifestyle     Physical activity     Days per week: None     Minutes per session: None     Stress: None   Relationships     Social connections     Talks on phone: None     Gets together: None     Attends Druze service: None     Active member of club or  "organization: None     Attends meetings of clubs or organizations: None     Relationship status: None     Intimate partner violence     Fear of current or ex partner: None     Emotionally abused: None     Physically abused: None     Forced sexual activity: None   Other Topics Concern     Parent/sibling w/ CABG, MI or angioplasty before 65F 55M? No      Service Not Asked     Blood Transfusions Not Asked     Caffeine Concern No     Comment: 2 cups of tea a day , occ Monster drink     Occupational Exposure Not Asked     Hobby Hazards Not Asked     Sleep Concern Yes     Comment: CPAP every night     Stress Concern No     Weight Concern No     Special Diet No     Back Care Not Asked     Exercise No     Bike Helmet Not Asked     Seat Belt Yes     Self-Exams Not Asked   Social History Narrative     None       CURRENT MEDICATIONS:  Current Outpatient Medications   Medication Sig Dispense Refill     Cholecalciferol (VITAMIN D) 1000 UNITS capsule Take 1 capsule by mouth daily       dhea 25 MG TABS Take 10 mg by mouth daily       lactobacillus rhamnosus, GG, (CULTURELL) capsule Take 1 capsule by mouth 2 times daily       metoprolol tartrate (LOPRESSOR) 25 MG tablet Take 2 tablets (50 mg) by mouth every morning AND 1 tablet (25 mg) every evening. 270 tablet 0     order for DME CPAP every night       venlafaxine (EFFEXOR XR) 37.5 MG 24 hr capsule Take 37.5 mg by mouth daily       ZOMIG ZMT 2.5 MG OR TBDP 1 TAB AT THE START OF THE ATTACK, THEN AS DIRECT 12 2     coenzyme Q-10 200 MG CAPS  (Patient not taking: Reported on 10/1/2021)           EXAM:  /75 (BP Location: Right arm, Patient Position: Chair, Cuff Size: Adult Regular)   Pulse 88   Ht 1.717 m (5' 7.6\")   Wt 75.8 kg (167 lb)   SpO2 97%   BMI 25.70 kg/m    General: appears comfortable, alert and articulate  Head: normocephalic, atraumatic  Eyes: anicteric sclera, EOMI  Neck: no adenopathy  Orophyarynx: moist mucosa, no lesions, dentition intact  Heart: " regular, S1/S2, no murmur, gallop, rub, estimated JVP <10cm  Lungs: clear, no rales or wheezing  Abdomen: soft, non-tender, bowel sounds present, no hepatomegaly  Extremities: no clubbing, cyanosis or edema  Neurological: normal speech and affect, no gross motor deficits    Labs:  CBC RESULTS:  Lab Results   Component Value Date    WBC 4.4 06/05/2020    RBC 4.43 06/05/2020    HGB 13.3 06/05/2020    HCT 40.2 06/05/2020    MCV 91 06/05/2020    MCH 30.0 06/05/2020    MCHC 33.1 06/05/2020    RDW 12.2 06/05/2020     06/05/2020       CMP RESULTS:  Lab Results   Component Value Date     06/05/2020    POTASSIUM 4.0 06/05/2020    CHLORIDE 109 06/05/2020    CO2 26 06/05/2020    ANIONGAP 5 06/05/2020     (H) 06/05/2020    BUN 14 06/05/2020    CR 0.75 06/05/2020    GFRESTIMATED 89 06/05/2020    GFRESTBLACK >90 06/05/2020    TONY 9.0 06/05/2020    BILITOTAL 0.6 06/05/2020    ALBUMIN 4.0 06/05/2020    ALKPHOS 74 06/05/2020    ALT 23 06/05/2020    AST 19 06/05/2020        CMR Report 6-  Clinical history: 54 year old woman with possible arrhythmogenic cardiomyopathy, no ICD. CMR to evaluate  biventricular function.  Comparison CMR: May 26, 2017  1. The left ventricle is normal in cavity size and wall thickness. The global systolic function is normal.  The LVEF is 63%. There are no regional wall motion abnormalities.  2. The right ventricle is normal in cavity size. There is mild hypokinesis of the apical third of the right  ventricular free wall without dyskinesis or aneurysm. The global systolic function is low-normal. The RVEF  is 51%.   3. Both atria are normal in size.  4. There is no significant valvular disease.   5. There is no late gadolinium enhancement to indicate myocardial fibrosis or infiltrative disease.   6. There is epicardial fat anteriorly overlying the right ventricle. There is no pericardial effusion or  thickening.  7. There is no intracardiac thrombus.  8. Bilateral breast implants  present.   CONCLUSIONS:   1. Normal left ventricular size and function with LVEF 63%.  2. Normal right ventricular size, mild distal free wall hypokinesis and low-normal function with RVEF 51%.   3. No evidence of myocardial fibrosis.  4. Above findings do not meet the task Force CMR derived criteria for the diagnosis of ARVC.   No significant changes compared to prior CMR dated 5/26/2017.        Leadless Monitor September 2021        Assessment and Plan:  56 female with arrhythmogenic cardiomyopathy with h/o atrial arrhythmias s/p ablation presents for ongoing evaluation and management.  1.Had extensive discussion with patient about pathophysiology of arrhythmogenic cardiomyopathy.   To try and reduce arrhythmia risk disease progression will continue beta-blockage therapy with her current dose of Metoprolol XL therapy.  She has not tolerated higher doses due to fatigue.  Reviewed with patient recommendations regarding exercise safety with arrhythmogenic cardiomyopathy.  Recommended that patient be able to comfortably speak 5-7 words at peak exercise and keep HR < 85% max predicted HR, thus for patient < 140 bpm, during exercise.  Also discussed with patient the need to avoid all stimulants including Sudafed and all ephedrine containing products as well as methamphetamines and cocaine and limit alcohol intake to less than 3-4 drinks per week.. Pt also seen by Dr. Jw Barlow today.  Please see his note for detailed discussion and plan.  Pt educated that any syncopal episode is considered a medical emergency requiring ER visit/evaluaion.  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  Although it is unclear if this variant alone could cause symptoms in Uzma, her son Gustabo who also carries this mutation, or other family members, it does raise suspicion.  Given this DSP variant, although very suspicious for cause of Uzma and Gustabo's  cardiomyopathy,  remains a VUS, we can not screen the family through genetic testing alone.  Pt again reminded that all first degree relatives should be screened for arrhythmogenic cardiomyopathy with cardiac MRI, EKG and Holter/Zio monitor.      Follow-up:  With me and Dr. Barlow in one year with echo and 14-day Zio patch prior.  Will be happy to see sooner if change in clinical status or new questions/concerns arise.      Madison Sorenson MD  Section Head - Advanced Heart Failure, Transplantation and Mechanical Circulatory Support  Director - Adult Congenital and Cardiovascular Genetics Center  Associate Professor of Medicine, [facility]    I spent a total of 30 minutes today with the patient personally reviewing recent cardiac testing and/or laboratory results, today's history and examination, and discussion and counseling with the patient.

## 2021-10-01 NOTE — NURSING NOTE
Cardiac Monitors: Patient was instructed regarding the indication, function, care and prompt return of a Zio patch holter  monitor. Patient demonstrated understanding of this information and agreed to call with further questions or concerns.    Diet: Patient instructed regarding a heart healthy diet, including discussion of reduced fat and sodium intake. Patient demonstrated understanding of this information and agreed to call with further questions or concerns.    Labs: Patient was given results of the laboratory testing obtained today. Patient was instructed to return for the next laboratory testing in one year. Patient demonstrated understanding of this information and agreed to call with further questions or concerns.     Med Reconcile: Reviewed and verified all current medications with the patient. The updated medication list was printed and given to the patient.    Return Appointment: Patient given instructions regarding scheduling next clinic visit. Patient demonstrated understanding of this information and agreed to call with further questions or concerns.    Patient stated she understood all health information given and agreed to call with further questions or concerns.    Luba Leonard, MSN, RN, CNL  Cardiology Care Coordinator  [facility]  [phone]

## 2021-10-01 NOTE — NURSING NOTE
Cardiac Monitors: Patient was instructed regarding the indication, function, care and prompt return of a Zio patch holter  monitor. Patient demonstrated understanding of this information and agreed to call with further questions or concerns.    Diet: Patient instructed regarding a heart healthy diet, including discussion of reduced fat and sodium intake. Patient demonstrated understanding of this information and agreed to call with further questions or concerns.    Labs: Patient was given results of the laboratory testing obtained today. Patient was instructed to return for the next laboratory testing in one year. Patient demonstrated understanding of this information and agreed to call with further questions or concerns.     Med Reconcile: Reviewed and verified all current medications with the patient. The updated medication list was printed and given to the patient.    Return Appointment: Patient given instructions regarding scheduling next clinic visit. Patient demonstrated understanding of this information and agreed to call with further questions or concerns.    Patient stated she understood all health information given and agreed to call with further questions or concerns.    Luba Leonard, MSN, RN, CNL  Cardiology Care Coordinator  [facility]  [phone]

## 2021-10-01 NOTE — PROGRESS NOTES
"HPI:  56 female with arrhythmogenic cardiomyopathy with h/o atrial arrhythmias s/p ablation presents for ongoing evaluation and management. Pt reports that since last visit she has been fairly well.  She continues to have some episodic episodes of atrial fib but these have not changed significantly from prior.  She continues to have some occasional atypical chest pain which remains without with any other associated symptoms and is not consistently associated with or worsened by exertion.  She denies any sob/page, orthopnea, pnd, syncope/presyncope, cole or change in her exercise tolerance.  She denies any problems with her medications and reports compliance.      PAST MEDICAL HISTORY:  Past Medical History:   Diagnosis Date     ADD (attention deficit disorder)      Atrial flutter (H)     ablation at [facility] 2013     Depression      Esophageal reflux      Other forms of migraine, without mention of intractable migraine without mention of status migrainosus      Paroxysmal atrial fibrillation (H)      Paroxysmal supraventricular tachycardia (H)      Unspecified asthma(493.90)        FAMILY HISTORY:  Family History   Problem Relation Age of Onset     Arrhythmia Mother         takes Metoprol      Cancer Father         lung-smoker     Myocardial Infarction Paternal Grandfather      Cancer Brother         tish passed, unsure if it was heart related     Bronchitis Brother      Татьяна's son Gustabo experienced chest pains, back tightness, and fever in 2017.  MRI listed differential diagnosis as viral myocarditis versus a\" hot phase\" of left dominant arrhythmogenic cardiomyopathy ([facility].) Gustabo has been followed in our [facility] clinic for years.  Recent genetic testing revealed that he carries a variant of unknown significance in the DSP gene (c.8527 A>C.     Татьяна's brother  suddenly at 49 years of age.  Autopsy revealed cardiomegaly and moderate fat investment on heart surface.      Another brother  at 54 " years.  Autopsy, not yet reviewed by us, stated brain aneurysm as cause of death.  They are unaware of any heart findings.      A third brother has LV hypertrophy, reduced LVEF, and some LA enlargement. She also reports that he has been using meth since the     Татьяна's mother also has a history of arrhythmia since 63 years.  She has reported palpitations and dizziness.     Татьяна's father  around 55 years from suspected cancer.    There is no additional history of cardiomyopathy, arrhythmias, heart attacks, fainting, sudden cardiac death, genetic conditions, or birth defects. (A copy of pedigree may be found under media tab).      SOCIAL HISTORY:  Social History     Socioeconomic History     Marital status:      Spouse name: None     Number of children: None     Years of education: None     Highest education level: None   Occupational History     None   Social Needs     Financial resource strain: None     Food insecurity     Worry: None     Inability: None     Transportation needs     Medical: None     Non-medical: None   Tobacco Use     Smoking status: Never Smoker     Smokeless tobacco: Never Used   Substance and Sexual Activity     Alcohol use: Yes     Comment: 3-5 per week     Drug use: No     Sexual activity: Yes     Partners: Male     Birth control/protection: Surgical     Comment: vasectomy   Lifestyle     Physical activity     Days per week: None     Minutes per session: None     Stress: None   Relationships     Social connections     Talks on phone: None     Gets together: None     Attends Congregation service: None     Active member of club or organization: None     Attends meetings of clubs or organizations: None     Relationship status: None     Intimate partner violence     Fear of current or ex partner: None     Emotionally abused: None     Physically abused: None     Forced sexual activity: None   Other Topics Concern     Parent/sibling w/ CABG, MI or angioplasty before 65F 55M? No      " Service Not Asked     Blood Transfusions Not Asked     Caffeine Concern No     Comment: 2 cups of tea a day , occ Monster drink     Occupational Exposure Not Asked     Hobby Hazards Not Asked     Sleep Concern Yes     Comment: CPAP every night     Stress Concern No     Weight Concern No     Special Diet No     Back Care Not Asked     Exercise No     Bike Helmet Not Asked     Seat Belt Yes     Self-Exams Not Asked   Social History Narrative     None       CURRENT MEDICATIONS:  Current Outpatient Medications   Medication Sig Dispense Refill     Cholecalciferol (VITAMIN D) 1000 UNITS capsule Take 1 capsule by mouth daily       dhea 25 MG TABS Take 10 mg by mouth daily       lactobacillus rhamnosus, GG, (CULTURELL) capsule Take 1 capsule by mouth 2 times daily       metoprolol tartrate (LOPRESSOR) 25 MG tablet Take 2 tablets (50 mg) by mouth every morning AND 1 tablet (25 mg) every evening. 270 tablet 0     order for DME CPAP every night       venlafaxine (EFFEXOR XR) 37.5 MG 24 hr capsule Take 37.5 mg by mouth daily       ZOMIG ZMT 2.5 MG OR TBDP 1 TAB AT THE START OF THE ATTACK, THEN AS DIRECT 12 2     coenzyme Q-10 200 MG CAPS  (Patient not taking: Reported on 10/1/2021)           EXAM:  /75 (BP Location: Right arm, Patient Position: Chair, Cuff Size: Adult Regular)   Pulse 88   Ht 1.717 m (5' 7.6\")   Wt 75.8 kg (167 lb)   SpO2 97%   BMI 25.70 kg/m    General: appears comfortable, alert and articulate  Head: normocephalic, atraumatic  Eyes: anicteric sclera, EOMI  Neck: no adenopathy  Orophyarynx: moist mucosa, no lesions, dentition intact  Heart: regular, S1/S2, no murmur, gallop, rub, estimated JVP <10cm  Lungs: clear, no rales or wheezing  Abdomen: soft, non-tender, bowel sounds present, no hepatomegaly  Extremities: no clubbing, cyanosis or edema  Neurological: normal speech and affect, no gross motor deficits    Labs:  CBC RESULTS:  Lab Results   Component Value Date    WBC 4.4 06/05/2020    " RBC 4.43 06/05/2020    HGB 13.3 06/05/2020    HCT 40.2 06/05/2020    MCV 91 06/05/2020    MCH 30.0 06/05/2020    MCHC 33.1 06/05/2020    RDW 12.2 06/05/2020     06/05/2020       CMP RESULTS:  Lab Results   Component Value Date     06/05/2020    POTASSIUM 4.0 06/05/2020    CHLORIDE 109 06/05/2020    CO2 26 06/05/2020    ANIONGAP 5 06/05/2020     (H) 06/05/2020    BUN 14 06/05/2020    CR 0.75 06/05/2020    GFRESTIMATED 89 06/05/2020    GFRESTBLACK >90 06/05/2020    TONY 9.0 06/05/2020    BILITOTAL 0.6 06/05/2020    ALBUMIN 4.0 06/05/2020    ALKPHOS 74 06/05/2020    ALT 23 06/05/2020    AST 19 06/05/2020        CMR Report 6-  Clinical history: 54 year old woman with possible arrhythmogenic cardiomyopathy, no ICD. CMR to evaluate  biventricular function.  Comparison CMR: May 26, 2017  1. The left ventricle is normal in cavity size and wall thickness. The global systolic function is normal.  The LVEF is 63%. There are no regional wall motion abnormalities.  2. The right ventricle is normal in cavity size. There is mild hypokinesis of the apical third of the right  ventricular free wall without dyskinesis or aneurysm. The global systolic function is low-normal. The RVEF  is 51%.   3. Both atria are normal in size.  4. There is no significant valvular disease.   5. There is no late gadolinium enhancement to indicate myocardial fibrosis or infiltrative disease.   6. There is epicardial fat anteriorly overlying the right ventricle. There is no pericardial effusion or  thickening.  7. There is no intracardiac thrombus.  8. Bilateral breast implants present.   CONCLUSIONS:   1. Normal left ventricular size and function with LVEF 63%.  2. Normal right ventricular size, mild distal free wall hypokinesis and low-normal function with RVEF 51%.   3. No evidence of myocardial fibrosis.  4. Above findings do not meet the task Force CMR derived criteria for the diagnosis of [facility].   No significant changes  compared to prior CMR dated 5/26/2017.        Leadless Monitor September 2021        Assessment and Plan:  56 female with arrhythmogenic cardiomyopathy with h/o atrial arrhythmias s/p ablation presents for ongoing evaluation and management.  1.Had extensive discussion with patient about pathophysiology of arrhythmogenic cardiomyopathy.   To try and reduce arrhythmia risk disease progression will continue beta-blockage therapy with her current dose of Metoprolol XL therapy.  She has not tolerated higher doses due to fatigue.  Reviewed with patient recommendations regarding exercise safety with arrhythmogenic cardiomyopathy.  Recommended that patient be able to comfortably speak 5-7 words at peak exercise and keep HR < 85% max predicted HR, thus for patient < 140 bpm, during exercise.  Also discussed with patient the need to avoid all stimulants including Sudafed and all ephedrine containing products as well as methamphetamines and cocaine and limit alcohol intake to less than 3-4 drinks per week.. Pt also seen by Dr. Jw Barlow today.  Please see his note for detailed discussion and plan.  Pt educated that any syncopal episode is considered a medical emergency requiring ER visit/evaluaion.  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  Although it is unclear if this variant alone could cause symptoms in Uzma, her son Gustabo who also carries this mutation, or other family members, it does raise suspicion.  Given this DSP variant, although very suspicious for cause of Uzma and Gustabo's cardiomyopathy,  remains a VUS, we can not screen the family through genetic testing alone.  Pt again reminded that all first degree relatives should be screened for arrhythmogenic cardiomyopathy with cardiac MRI, EKG and Holter/Zio monitor.      Follow-up:  With me and Dr. Barlow in one year with echo and 14-day Zio patch prior.  Will be happy to see  sooner if change in clinical status or new questions/concerns arise.      Madison Sorenson MD  Section Head - Advanced Heart Failure, Transplantation and Mechanical Circulatory Support  Director - Adult Congenital and Cardiovascular Genetics Center  Associate Professor of Medicine, [facility]    I spent a total of 30 minutes today with the patient personally reviewing recent cardiac testing and/or laboratory results, today's history and examination, and discussion and counseling with the patient.

## 2021-10-01 NOTE — LETTER
10/1/2021      RE: Umza Clemons  [address] [facility] [address]       Dear Colleague,    Thank you for the opportunity to participate in the care of your patient, Uzma Clemons, at the [facility] [place] at [facility]. Please see a copy of my visit note below.          Clinical Cardiac Electrophysiology    Chief Complaint: arvc/afib     HPI: I have not seen Uzma for several years. She has likely ARVC and a history of ablation for atrial arrhythmias. She has no complaints today. Her palpitations are unchanged. She remains active but mindful of her her limitations.     Current Outpatient Medications   Medication Sig Dispense Refill     Cholecalciferol (VITAMIN D) 1000 UNITS capsule Take 1 capsule by mouth daily       coenzyme Q-10 200 MG CAPS  (Patient not taking: Reported on 10/1/2021)       dhea 25 MG TABS Take 10 mg by mouth daily       lactobacillus rhamnosus, GG, (CULTURELL) capsule Take 1 capsule by mouth 2 times daily       metoprolol tartrate (LOPRESSOR) 25 MG tablet Take 2 tabs (50 mg) in am and 1 tab (25 mg) in pm 270 tablet 3     order for DME CPAP every night       venlafaxine (EFFEXOR XR) 37.5 MG 24 hr capsule Take 37.5 mg by mouth daily       ZOMIG ZMT 2.5 MG OR TBDP 1 TAB AT THE START OF THE ATTACK, THEN AS DIRECT 12 2       Past Medical History:   Diagnosis Date     ADD (attention deficit disorder)      Atrial flutter (H)     ablation at [facility] 2013     Depression      Esophageal reflux      Other forms of migraine, without mention of intractable migraine without mention of status migrainosus      Paroxysmal atrial fibrillation (H)      Paroxysmal supraventricular tachycardia (H)      Unspecified asthma(493.90)        Past Surgical History:   Procedure Laterality Date     H ABLATION ATRIAL FLUTTER  03/2014    at [facility] [place]     HC ENLARGE BREAST WITH IMPLANT       HC REMOVE TONSILS/ADENOIDS,<13 Y/O    "      Family History   Problem Relation Age of Onset     Arrhythmia Mother         takes Metoprol      Cancer Father         lung-smoker     Myocardial Infarction Paternal Grandfather      Cancer Brother         jsut passed, unsure if it was heart related     Bronchitis Brother        Social History     Tobacco Use     Smoking status: Never Smoker     Smokeless tobacco: Never Used   Substance Use Topics     Alcohol use: Yes     Comment: 3-5 per week       Allergies   Allergen Reactions     No Known Drug Allergies      Physical examination:     /75 (BP Location: Right arm, Patient Position: Chair, Cuff Size: Adult Regular)   Pulse 88    Ht 1.717 m (5' 7.6\")    Wt 75.8 kg (167 lb)    SpO2 97%    BMI 25.70 kg/m    BSA 1.9 m   GENERAL APPEARANCE: healthy, alert and no distress  NECK: no venous distention  RESPIRATORY: lungs clear to auscultation - no rales, rhonchi or wheezes  CARDIOVASCULAR: regular, normal S1 S2, no S3 or S4 and no murmur, click or rub, precordium quiet with normal PMI  ABDOMEN: soft, non tender with normal bowel sounds   EXTREMITIES: no edema    Laboratory and diagnostic data personally reviewed:        Assessment and recommendations:    1) Likely ARVC  2) Non-sustained VT     She has not had syncope or near syncope. She is on maximal tolerated dose of metoprolol.    She should call 911 if she has syncope and notify the clinic if her palpitations become worse or she experiences near syncope.    3) Paroxysmal atrial fibrillation - unchanged; rate control during episodes is poor but she does not tolerate higher dose of beta blocker.     I appreciate the chance to help with Ms. Clemons's care. Please let me know if you have any questions or concerns.      Please do not hesitate to contact me if you have any questions/concerns.     Sincerely,     [facility]    "

## 2021-10-06 ENCOUNTER — TELEPHONE (OUTPATIENT)
Dept: CARDIOLOGY | Facility: CLINIC | Age: 56
End: 2021-10-06

## 2021-10-06 NOTE — TELEPHONE ENCOUNTER
Spoke with Uzma today to review results of genetic testing. She underwent genetic testing in the [employer] [facility] program for the gene variant found in her son Gustabo. DNA was collected via saliva and sent to [employer] [facility] laboratory.   Testing revealed that Uzma CARRIES the DSP variant of unknown significance found in her  family (c.8527 A>C).  A variant of unknown significance means that there is a genetic change in which we do not have enough information to determine if it is disease causing or not. Labs review published data, functional studies, presences in population databases, similarity to normal sequence, and computer prediction models.  The DSP gene is known to be associated with arrhythmogenic cardiomyopathy (ACM), particularly with left ventricular involvement. This particular variant occurs in a position that is well conserved (meaning that it is not use to changes) and creates an amino acid with dissimilar properties.  However, computer models predict that this change will be tolerated. Although suspicion is raisied, there is not enough current information to be certain if this variant alone can cause to disease or not.   Although it is unclear if this variant alone could cause symptoms in Ernesto, or other family members, it does raise suspicion.    Татьяна's brother Ernie had an autopsy that showed cardiomegaly and moderate fat investment on heart surface. This history added to the possiblity of ACM in Gustabo and Bola's.  This result adds to that suspicion but does NOT confirm diagnosis or genetic cause.    Татьяна's other brother Jose has been diagnosed with heart failure and was seen at [facility].  He currently is awaiting genetic test results that they had ordered.  If he too carries this DSP mutation it will add to the suspicion of this variant.    Татьяна will notify me when she learns of Jose's results.   A summary letter and copy of the results will be sent to patient.  All questions answered at this time.   Luba Michelle MS, [facility]  Licensed, Certified Genetic Counselor  [facility]  [facility]

## 2021-10-19 DIAGNOSIS — I42.8 ARRHYTHMOGENIC LEFT VENTRICULAR DYSPLASIA (H): ICD-10-CM

## 2021-10-20 NOTE — TELEPHONE ENCOUNTER
metoprolol tartrate (LOPRESSOR) 25 MG tablet      Last Written Prescription Date:  7/27/2021  Last Fill Quantity: 270,   # refills: 0  Last Office Visit : 10/1/2021 ??  Future Office visit:  none    Routing refill request to provider for review/approval because:  Refill needs review- clarification/plan:  - 10/1/2021 visit shows arrived but no provider documentation  - otherwise prior last visit 7/3/2020 increase dose Metoprolol Succinate 50 mg XL QD  - request for Metoprolol Tartrate 50 mg in the AM and 25 mg in the PM

## 2021-10-21 RX ORDER — METOPROLOL TARTRATE 25 MG/1
TABLET, FILM COATED ORAL
Qty: 270 TABLET | Refills: 3 | Status: SHIPPED | OUTPATIENT
Start: 2021-10-21

## 2021-10-24 NOTE — PROGRESS NOTES
Clinical Cardiac Electrophysiology    Chief Complaint: arvc/afib     HPI: I have not seen Uzma for several years. She has likely ARVC and a history of ablation for atrial arrhythmias. She has no complaints today. Her palpitations are unchanged. She remains active but mindful of her her limitations.     Current Outpatient Medications   Medication Sig Dispense Refill     Cholecalciferol (VITAMIN D) 1000 UNITS capsule Take 1 capsule by mouth daily       coenzyme Q-10 200 MG CAPS  (Patient not taking: Reported on 10/1/2021)       dhea 25 MG TABS Take 10 mg by mouth daily       lactobacillus rhamnosus, GG, (CULTURELL) capsule Take 1 capsule by mouth 2 times daily       metoprolol tartrate (LOPRESSOR) 25 MG tablet Take 2 tabs (50 mg) in am and 1 tab (25 mg) in pm 270 tablet 3     order for DME CPAP every night       venlafaxine (EFFEXOR XR) 37.5 MG 24 hr capsule Take 37.5 mg by mouth daily       ZOMIG ZMT 2.5 MG OR TBDP 1 TAB AT THE START OF THE ATTACK, THEN AS DIRECT 12 2       Past Medical History:   Diagnosis Date     ADD (attention deficit disorder)      Atrial flutter (H)     ablation at [facility] 2013     Depression      Esophageal reflux      Other forms of migraine, without mention of intractable migraine without mention of status migrainosus      Paroxysmal atrial fibrillation (H)      Paroxysmal supraventricular tachycardia (H)      Unspecified asthma(493.90)        Past Surgical History:   Procedure Laterality Date     H ABLATION ATRIAL FLUTTER  03/2014    at [facility]     HC ENLARGE BREAST WITH IMPLANT       HC REMOVE TONSILS/ADENOIDS,<11 Y/O         Family History   Problem Relation Age of Onset     Arrhythmia Mother         takes Metoprol      Cancer Father         lung-smoker     Myocardial Infarction Paternal Grandfather      Cancer Brother         jsut passed, unsure if it was heart related     Bronchitis Brother        Social History     Tobacco Use     Smoking status: Never Smoker      "Smokeless tobacco: Never Used   Substance Use Topics     Alcohol use: Yes     Comment: 3-5 per week       Allergies   Allergen Reactions     No Known Drug Allergies      Physical examination:     /75 (BP Location: Right arm, Patient Position: Chair, Cuff Size: Adult Regular)   Pulse 88    Ht 1.717 m (5' 7.6\")    Wt 75.8 kg (167 lb)    SpO2 97%    BMI 25.70 kg/m    BSA 1.9 m   GENERAL APPEARANCE: healthy, alert and no distress  NECK: no venous distention  RESPIRATORY: lungs clear to auscultation - no rales, rhonchi or wheezes  CARDIOVASCULAR: regular, normal S1 S2, no S3 or S4 and no murmur, click or rub, precordium quiet with normal PMI  ABDOMEN: soft, non tender with normal bowel sounds   EXTREMITIES: no edema    Laboratory and diagnostic data personally reviewed:        Assessment and recommendations:    1) Likely ARVC  2) Non-sustained VT     She has not had syncope or near syncope. She is on maximal tolerated dose of metoprolol.    She should call 911 if she has syncope and notify the clinic if her palpitations become worse or she experiences near syncope.    3) Paroxysmal atrial fibrillation - unchanged; rate control during episodes is poor but she does not tolerate higher dose of beta blocker.     I appreciate the chance to help with Ms. Clemons's care. Please let me know if you have any questions or concerns.      "

## 2021-12-08 ENCOUNTER — CARE COORDINATION (OUTPATIENT)
Dept: CARDIOLOGY | Facility: CLINIC | Age: 56
End: 2021-12-08
Payer: COMMERCIAL

## 2021-12-08 NOTE — PROGRESS NOTES
[facility] Conference  21    Demographic Information on Uzma Clemons:    Uzma Clemons  Gender: female  : 1965    [facility] Physician:  Dr. Sorenson  [facility] RN Coordinator:  ZACH Verduzco      Conference Participants:  1. Dr. Sorenson, Dr. Neff, Luba Michelle, Jhoana Heath, and Luba Leonard    Patient Medical History:    Arrhythmogenic Cardiomyopathy    Discussion Notes:  Discussed patient's recent MyChart questions regarding whether or not patient carries diagnosis of ARVC. Reviewed that patient does not have classic ARVC, but does have ACM. An inherited ACM, like Uzma's, can manifest in the right ventricle, the left ventricle, or both. It is common to see it in multiple places-- the right ventricle, the left ventricle, or both. If it manifests exclusively in the right ventricle, then it is considered ARVC. Hers is not: the right ventricle is not weak nor is it more severely effected.      Will call patient with this information. Will also inquire to whether we can get access to her brother's MRI images, and about her other son and his testing.     21 Called patient and LVM with callback information.   12/15/21 Called patient and LVM with callback information.  21 Called patient and unable to leave . Sent [employer] message.

## 2022-02-16 NOTE — PROCEDURE: FILLERS
Price (Use Numbers Only, No Special Characters Or $): 6982 Price (Use Numbers Only, No Special Characters Or $): 5347 Imiquimod Counseling:  I discussed with the patient the risks of imiquimod including but not limited to erythema, scaling, itching, weeping, crusting, and pain.  Patient understands that the inflammatory response to imiquimod is variable from person to person and was educated regarded proper titration schedule.  If flu-like symptoms develop, patient knows to discontinue the medication and contact us.

## 2022-02-19 ENCOUNTER — HEALTH MAINTENANCE LETTER (OUTPATIENT)
Age: 57
End: 2022-02-19

## 2022-03-25 ENCOUNTER — HOSPITAL ENCOUNTER (OUTPATIENT)
Dept: MAMMOGRAPHY | Facility: CLINIC | Age: 57
Discharge: HOME | End: 2022-03-25
Attending: PHYSICIAN ASSISTANT | Admitting: PHYSICIAN ASSISTANT
Payer: COMMERCIAL

## 2022-03-25 DIAGNOSIS — Z12.31 ENCOUNTER FOR SCREENING MAMMOGRAM FOR BREAST CANCER: ICD-10-CM

## 2022-03-25 PROCEDURE — 77067 SCR MAMMO BI INCL CAD: CPT

## 2022-04-22 NOTE — PROGRESS NOTES
Patient left  returning call to Luba.     Recommended Eylea injection today. The injection was given and tolerated well by patient. Post-injection instructions were reviewed and understood by the patient.

## 2022-04-28 ENCOUNTER — APPOINTMENT (OUTPATIENT)
Dept: URBAN - METROPOLITAN AREA CLINIC 253 | Age: 57
Setting detail: DERMATOLOGY
End: 2022-05-01

## 2022-04-28 VITALS — HEIGHT: 68 IN | WEIGHT: 160 LBS | RESPIRATION RATE: 15 BRPM

## 2022-04-28 DIAGNOSIS — D22 MELANOCYTIC NEVI: ICD-10-CM

## 2022-04-28 DIAGNOSIS — L82.1 OTHER SEBORRHEIC KERATOSIS: ICD-10-CM

## 2022-04-28 DIAGNOSIS — Z71.89 OTHER SPECIFIED COUNSELING: ICD-10-CM

## 2022-04-28 DIAGNOSIS — L20.89 OTHER ATOPIC DERMATITIS: ICD-10-CM

## 2022-04-28 DIAGNOSIS — L81.4 OTHER MELANIN HYPERPIGMENTATION: ICD-10-CM

## 2022-04-28 DIAGNOSIS — D18.0 HEMANGIOMA: ICD-10-CM

## 2022-04-28 PROBLEM — D22.5 MELANOCYTIC NEVI OF TRUNK: Status: ACTIVE | Noted: 2022-04-28

## 2022-04-28 PROBLEM — D18.01 HEMANGIOMA OF SKIN AND SUBCUTANEOUS TISSUE: Status: ACTIVE | Noted: 2022-04-28

## 2022-04-28 PROBLEM — L20.84 INTRINSIC (ALLERGIC) ECZEMA: Status: ACTIVE | Noted: 2022-04-28

## 2022-04-28 PROBLEM — D22.39 MELANOCYTIC NEVI OF OTHER PARTS OF FACE: Status: ACTIVE | Noted: 2022-04-28

## 2022-04-28 PROBLEM — D48.5 NEOPLASM OF UNCERTAIN BEHAVIOR OF SKIN: Status: ACTIVE | Noted: 2022-04-28

## 2022-04-28 PROCEDURE — OTHER PRESCRIPTION: OTHER

## 2022-04-28 PROCEDURE — OTHER MIPS QUALITY: OTHER

## 2022-04-28 PROCEDURE — OTHER COUNSELING: OTHER

## 2022-04-28 PROCEDURE — 99213 OFFICE O/P EST LOW 20 MIN: CPT | Mod: 25

## 2022-04-28 PROCEDURE — OTHER BIOPSY BY SHAVE METHOD: OTHER

## 2022-04-28 PROCEDURE — OTHER ADDITIONAL NOTES: OTHER

## 2022-04-28 PROCEDURE — 11102 TANGNTL BX SKIN SINGLE LES: CPT

## 2022-04-28 RX ORDER — CLOBETASOL PROPIONATE 0.5 MG/G
0.05% CREAM TOPICAL BID
Qty: 30 | Refills: 1 | Status: ERX | COMMUNITY
Start: 2022-04-28

## 2022-04-28 ASSESSMENT — LOCATION SIMPLE DESCRIPTION DERM
LOCATION SIMPLE: RIGHT EYEBROW
LOCATION SIMPLE: ABDOMEN
LOCATION SIMPLE: UPPER BACK

## 2022-04-28 ASSESSMENT — LOCATION DETAILED DESCRIPTION DERM
LOCATION DETAILED: RIGHT MEDIAL EYEBROW
LOCATION DETAILED: INFERIOR THORACIC SPINE
LOCATION DETAILED: RIGHT LATERAL ABDOMEN

## 2022-04-28 ASSESSMENT — LOCATION ZONE DERM
LOCATION ZONE: TRUNK
LOCATION ZONE: FACE

## 2022-04-28 NOTE — PROCEDURE: ADDITIONAL NOTES
Render Risk Assessment In Note?: no
Detail Level: Zone
Additional Notes: She prefers SRT if needed. Informational resources provided.
Additional Notes: Recommend deferring removal.
Additional Notes: Treated 3 lesions on left lower extremity at no charge.

## 2022-04-28 NOTE — PROCEDURE: BIOPSY BY SHAVE METHOD
Billing Type: Third-Party Bill
Depth Of Biopsy: dermis
Dressing: bandage
Anesthesia Volume In Cc (Will Not Render If 0): 0.3
Silver Nitrate Text: The wound bed was treated with silver nitrate after the biopsy was performed.
Render In Bullet Format When Appropriate: No
Electrodesiccation And Curettage Text: The wound bed was treated with electrodesiccation and curettage after the biopsy was performed.
Additional Anesthesia Volume In Cc (Will Not Render If 0): 0
Was A Bandage Applied: Yes
Hemostasis: Drysol
Biopsy Type: H and E
Notification Instructions: Patient will be notified of biopsy results. However, patient instructed to call the office if not contacted within 2 weeks.
Consent: Written consent was obtained and risks were reviewed including but not limited to scarring, infection, bleeding, scabbing, incomplete removal, nerve damage and allergy to anesthesia.
Electrodesiccation Text: The wound bed was treated with electrodesiccation after the biopsy was performed.
Cryotherapy Text: The wound bed was treated with cryotherapy after the biopsy was performed.
Post-Care Instructions: I reviewed with the patient in detail post-care instructions. Patient is to keep the biopsy site dry overnight, and then apply bacitracin twice daily until healed. Patient may apply hydrogen peroxide soaks to remove any crusting.
Type Of Destruction Used: Curettage
Anesthesia Type: 2% lidocaine with epinephrine and a 1:10 solution of 8.4% sodium bicarbonate
Curettage Text: The wound bed was treated with curettage after the biopsy was performed.
Detail Level: Detailed
Biopsy Method: Dermablade
Wound Care: Petrolatum
Information: Selecting Yes will display possible errors in your note based on the variables you have selected. This validation is only offered as a suggestion for you. PLEASE NOTE THAT THE VALIDATION TEXT WILL BE REMOVED WHEN YOU FINALIZE YOUR NOTE. IF YOU WANT TO FAX A PRELIMINARY NOTE YOU WILL NEED TO TOGGLE THIS TO 'NO' IF YOU DO NOT WANT IT IN YOUR FAXED NOTE.

## 2022-05-16 ENCOUNTER — APPOINTMENT (OUTPATIENT)
Dept: URBAN - METROPOLITAN AREA CLINIC 253 | Age: 57
Setting detail: DERMATOLOGY
End: 2022-05-17

## 2022-05-16 DIAGNOSIS — L57.0 ACTINIC KERATOSIS: ICD-10-CM

## 2022-05-16 DIAGNOSIS — L82.1 OTHER SEBORRHEIC KERATOSIS: ICD-10-CM

## 2022-05-16 PROCEDURE — OTHER COUNSELING: OTHER

## 2022-05-16 PROCEDURE — 17000 DESTRUCT PREMALG LESION: CPT

## 2022-05-16 PROCEDURE — OTHER ADDITIONAL NOTES: OTHER

## 2022-05-16 PROCEDURE — OTHER LIQUID NITROGEN: OTHER

## 2022-05-16 ASSESSMENT — LOCATION ZONE DERM
LOCATION ZONE: TRUNK
LOCATION ZONE: NOSE

## 2022-05-16 ASSESSMENT — LOCATION DETAILED DESCRIPTION DERM
LOCATION DETAILED: NASAL TIP
LOCATION DETAILED: INFERIOR THORACIC SPINE

## 2022-05-16 ASSESSMENT — LOCATION SIMPLE DESCRIPTION DERM
LOCATION SIMPLE: UPPER BACK
LOCATION SIMPLE: NOSE

## 2022-05-16 NOTE — PROCEDURE: ADDITIONAL NOTES
Detail Level: Zone
Detail Level: Detailed
Additional Notes: Previously treated lesions are in the healing process.
Additional Notes: PDT PA initiated, requested approval for two treatments, 1-2 months apart.
Render Risk Assessment In Note?: no

## 2022-05-16 NOTE — PROCEDURE: LIQUID NITROGEN
Post-Care Instructions: I reviewed with the patient in detail post-care instructions. Patient is to wear sunprotection, and avoid picking at any of the treated lesions. Pt may apply Vaseline to crusted or scabbing areas.
Show Aperture Variable?: Yes
Render Note In Bullet Format When Appropriate: No
Duration Of Freeze Thaw-Cycle (Seconds): 2
Number Of Freeze-Thaw Cycles: 3 freeze-thaw cycles
Consent: The patient's consent was obtained including but not limited to risks of crusting, scabbing, blistering, scarring, darker or lighter pigmentary change, recurrence, incomplete removal and infection.
Detail Level: Detailed

## 2022-06-09 ENCOUNTER — APPOINTMENT (OUTPATIENT)
Dept: URBAN - METROPOLITAN AREA CLINIC 260 | Age: 57
Setting detail: DERMATOLOGY
End: 2022-06-12

## 2022-06-09 DIAGNOSIS — L57.0 ACTINIC KERATOSIS: ICD-10-CM

## 2022-06-09 PROCEDURE — OTHER PDT: BLUE: OTHER

## 2022-06-09 PROCEDURE — OTHER EDUCATIONAL RESOURCES PROVIDED: OTHER

## 2022-06-09 PROCEDURE — 96567 PDT DSTR PRMLG LES SKN: CPT

## 2022-06-09 PROCEDURE — OTHER ADDITIONAL NOTES: OTHER

## 2022-06-09 ASSESSMENT — LOCATION SIMPLE DESCRIPTION DERM: LOCATION SIMPLE: LEFT CHEEK

## 2022-06-09 ASSESSMENT — LOCATION ZONE DERM: LOCATION ZONE: FACE

## 2022-06-09 ASSESSMENT — LOCATION DETAILED DESCRIPTION DERM: LOCATION DETAILED: LEFT INFERIOR CENTRAL MALAR CHEEK

## 2022-06-09 NOTE — PROCEDURE: ADDITIONAL NOTES
Additional Notes: Samples of sunscreen provided.
Detail Level: Simple
Render Risk Assessment In Note?: no
Additional Notes: Patient presented with a clean face. Patient's face was cleansed with alcohol. Levulan was applied to the face by Yash. Patient incubated for 60 minutes. Patient was under the light for 16:40 minutes. Patient tolerated the procedure well. Patient was provided cold water to rinse the Levulan off and an icepack. 50 SPF sunscreen was applied to the area and a sample bag of sunscreen was given. A hat was provided for patient to wear outside. Reminded patient to avoid the sun for 48 hours. A detailed aftercare sheet was provided. Reminded patient to call with any concerns that they may have.
Detail Level: Detailed

## 2022-10-16 ENCOUNTER — HEALTH MAINTENANCE LETTER (OUTPATIENT)
Age: 57
End: 2022-10-16

## 2023-04-01 ENCOUNTER — HEALTH MAINTENANCE LETTER (OUTPATIENT)
Age: 58
End: 2023-04-01

## 2024-03-17 ENCOUNTER — APPOINTMENT (OUTPATIENT)
Dept: CT IMAGING | Facility: CLINIC | Age: 59
End: 2024-03-17
Attending: EMERGENCY MEDICINE
Payer: COMMERCIAL

## 2024-03-17 ENCOUNTER — APPOINTMENT (OUTPATIENT)
Dept: MRI IMAGING | Facility: CLINIC | Age: 59
End: 2024-03-17
Attending: EMERGENCY MEDICINE
Payer: COMMERCIAL

## 2024-03-17 ENCOUNTER — HOSPITAL ENCOUNTER (EMERGENCY)
Facility: CLINIC | Age: 59
Discharge: HOME | End: 2024-03-17
Attending: EMERGENCY MEDICINE | Admitting: EMERGENCY MEDICINE
Payer: COMMERCIAL

## 2024-03-17 VITALS
SYSTOLIC BLOOD PRESSURE: 120 MMHG | HEART RATE: 58 BPM | RESPIRATION RATE: 17 BRPM | OXYGEN SATURATION: 99 % | DIASTOLIC BLOOD PRESSURE: 85 MMHG | TEMPERATURE: 98 F

## 2024-03-17 DIAGNOSIS — R51.9 ACUTE NONINTRACTABLE HEADACHE, UNSPECIFIED HEADACHE TYPE: ICD-10-CM

## 2024-03-17 DIAGNOSIS — H53.9 VISION CHANGES: ICD-10-CM

## 2024-03-17 LAB
ANION GAP SERPL CALCULATED.3IONS-SCNC: 12 MMOL/L (ref 7–15)
BASOPHILS # BLD AUTO: 0 10E3/UL (ref 0–0.2)
BASOPHILS NFR BLD AUTO: 1 %
BUN SERPL-MCNC: 14 MG/DL (ref 8–23)
CALCIUM SERPL-MCNC: 9.9 MG/DL (ref 8.6–10)
CHLORIDE SERPL-SCNC: 99 MMOL/L (ref 98–107)
CREAT SERPL-MCNC: 0.78 MG/DL (ref 0.51–0.95)
CRP SERPL-MCNC: 10.56 MG/L
DEPRECATED HCO3 PLAS-SCNC: 26 MMOL/L (ref 22–29)
EGFRCR SERPLBLD CKD-EPI 2021: 87 ML/MIN/1.73M2
EOSINOPHIL # BLD AUTO: 0.1 10E3/UL (ref 0–0.7)
EOSINOPHIL NFR BLD AUTO: 2 %
ERYTHROCYTE [DISTWIDTH] IN BLOOD BY AUTOMATED COUNT: 13.1 % (ref 10–15)
ERYTHROCYTE [SEDIMENTATION RATE] IN BLOOD BY WESTERGREN METHOD: 16 MM/HR (ref 0–30)
GLUCOSE SERPL-MCNC: 121 MG/DL (ref 70–99)
HCT VFR BLD AUTO: 42 % (ref 35–47)
HGB BLD-MCNC: 14.1 G/DL (ref 11.7–15.7)
HOLD SPECIMEN: NORMAL
IMM GRANULOCYTES # BLD: 0 10E3/UL
IMM GRANULOCYTES NFR BLD: 0 %
LYMPHOCYTES # BLD AUTO: 1.5 10E3/UL (ref 0.8–5.3)
LYMPHOCYTES NFR BLD AUTO: 30 %
MCH RBC QN AUTO: 29.8 PG (ref 26.5–33)
MCHC RBC AUTO-ENTMCNC: 33.6 G/DL (ref 31.5–36.5)
MCV RBC AUTO: 89 FL (ref 78–100)
MONOCYTES # BLD AUTO: 0.3 10E3/UL (ref 0–1.3)
MONOCYTES NFR BLD AUTO: 7 %
NEUTROPHILS # BLD AUTO: 3 10E3/UL (ref 1.6–8.3)
NEUTROPHILS NFR BLD AUTO: 60 %
NRBC # BLD AUTO: 0 10E3/UL
NRBC BLD AUTO-RTO: 0 /100
PLATELET # BLD AUTO: 224 10E3/UL (ref 150–450)
POTASSIUM SERPL-SCNC: 4.3 MMOL/L (ref 3.4–5.3)
RBC # BLD AUTO: 4.73 10E6/UL (ref 3.8–5.2)
SODIUM SERPL-SCNC: 137 MMOL/L (ref 135–145)
WBC # BLD AUTO: 5 10E3/UL (ref 4–11)

## 2024-03-17 PROCEDURE — 85025 COMPLETE CBC W/AUTO DIFF WBC: CPT | Performed by: EMERGENCY MEDICINE

## 2024-03-17 PROCEDURE — 255N000002 HC RX 255 OP 636: Performed by: EMERGENCY MEDICINE

## 2024-03-17 PROCEDURE — 80048 BASIC METABOLIC PNL TOTAL CA: CPT | Performed by: EMERGENCY MEDICINE

## 2024-03-17 PROCEDURE — 70450 CT HEAD/BRAIN W/O DYE: CPT

## 2024-03-17 PROCEDURE — A9585 GADOBUTROL INJECTION: HCPCS | Performed by: EMERGENCY MEDICINE

## 2024-03-17 PROCEDURE — 250N000009 HC RX 250: Performed by: EMERGENCY MEDICINE

## 2024-03-17 PROCEDURE — 99285 EMERGENCY DEPT VISIT HI MDM: CPT | Mod: 25

## 2024-03-17 PROCEDURE — 70553 MRI BRAIN STEM W/O & W/DYE: CPT

## 2024-03-17 PROCEDURE — 93005 ELECTROCARDIOGRAM TRACING: CPT

## 2024-03-17 PROCEDURE — 70496 CT ANGIOGRAPHY HEAD: CPT

## 2024-03-17 PROCEDURE — 85652 RBC SED RATE AUTOMATED: CPT | Performed by: EMERGENCY MEDICINE

## 2024-03-17 PROCEDURE — 250N000011 HC RX IP 250 OP 636: Performed by: EMERGENCY MEDICINE

## 2024-03-17 PROCEDURE — 86140 C-REACTIVE PROTEIN: CPT | Performed by: EMERGENCY MEDICINE

## 2024-03-17 PROCEDURE — 36415 COLL VENOUS BLD VENIPUNCTURE: CPT | Performed by: EMERGENCY MEDICINE

## 2024-03-17 RX ORDER — IOPAMIDOL 755 MG/ML
500 INJECTION, SOLUTION INTRAVASCULAR ONCE
Status: COMPLETED | OUTPATIENT
Start: 2024-03-17 | End: 2024-03-17

## 2024-03-17 RX ORDER — GADOBUTROL 604.72 MG/ML
7.5 INJECTION INTRAVENOUS ONCE
Status: COMPLETED | OUTPATIENT
Start: 2024-03-17 | End: 2024-03-17

## 2024-03-17 RX ADMIN — GADOBUTROL 7.5 ML: 604.72 INJECTION INTRAVENOUS at 16:51

## 2024-03-17 RX ADMIN — IOPAMIDOL 67 ML: 755 INJECTION, SOLUTION INTRAVENOUS at 14:17

## 2024-03-17 RX ADMIN — SODIUM CHLORIDE 80 ML: 9 INJECTION, SOLUTION INTRAVENOUS at 14:17

## 2024-03-17 ASSESSMENT — COLUMBIA-SUICIDE SEVERITY RATING SCALE - C-SSRS
2. HAVE YOU ACTUALLY HAD ANY THOUGHTS OF KILLING YOURSELF IN THE PAST MONTH?: NO
6. HAVE YOU EVER DONE ANYTHING, STARTED TO DO ANYTHING, OR PREPARED TO DO ANYTHING TO END YOUR LIFE?: NO
1. IN THE PAST MONTH, HAVE YOU WISHED YOU WERE DEAD OR WISHED YOU COULD GO TO SLEEP AND NOT WAKE UP?: NO

## 2024-03-17 ASSESSMENT — ACTIVITIES OF DAILY LIVING (ADL)
ADLS_ACUITY_SCORE: 35
ADLS_ACUITY_SCORE: 33
ADLS_ACUITY_SCORE: 35

## 2024-03-17 NOTE — DISCHARGE INSTRUCTIONS
Please see neurologist soon as possible for your abnormal MRI.  Please return if your symptoms return or develop any new neurologic changes.

## 2024-03-17 NOTE — ED PROVIDER NOTES
History     Chief Complaint:  Headache     The history is provided by the patient.      Uzma Clemons is a 58 year old female, anticoagulated with Eliquis, with history of a-fib and migraines who presents to the ED with her  for evaluation of a headache. The patient reports that at 1100 she had onset of a slight headache over her left eye, as well as bilateral vision changes. She describes her vision changes as blurry vision, black spots in her vision, and zig-zag lines in her peripheral vision of both eyes. Upon my evaluation, she now has only a little blurry vision, josue lateral left eye field. She denies face numbness or tingling, speech changes, arm or leg numbness, abdominal pain, or new chest pain. Patient is on Eliquis and states that she had an ablation on Wednesday for a-fib, and since the ablation and she had bad back pain.    Independent Historian:    None     Review of External Notes:  Reviewed cardiology office visit from 2/14/2024.  Patient was seen for paroxysmal atrial fibrillation and they are planning for an ablation.  She was to take Eliquis for 1 week prior to the ablation and then continue it after the ablation.    Medications:    Culturell  Lopressor  Effexor  Ambien  Ritalin   Pradaxa   Zomig   Antivert  Metformin   Toprol   Eliquis   Protonix  Lasix     Past Medical History:    ADD  Atrial flutter  Depression  GERD  Migraines  Paroxysmal atrial fibrillation  Paroxysmal supraventricular tachycardia  Asthma  ADHD  Congenital anomalies of the heart  Obstructive sleep apnea   Arrhythmogenic right ventricular cardiomyopathy   Onychomycosis     Past Surgical History:    Ablation atrial flutter   Breast augmentation  Breast augmentation revision   Tonsillectomy and adenoidectomy  Left bunionectomy   Left wrist fracture surgery  Left finger fracture surgery  Right bunionectomy     Physical Exam   Patient Vitals for the past 24 hrs:   BP Temp Pulse Resp SpO2   03/17/24 1217 120/85 -- 59  (!) 9 100 %   03/17/24 1150 (!) 143/80 98  F (36.7  C) 84 18 99 %      Physical Exam  Vitals and nursing note reviewed.   Constitutional:       General: She is not in acute distress.     Appearance: She is not ill-appearing.   HENT:      Head: Normocephalic and atraumatic.      Right Ear: External ear normal.      Left Ear: External ear normal.      Nose: Nose normal.      Mouth/Throat:      Mouth: Mucous membranes are moist.   Eyes:      General: No visual field deficit.     Extraocular Movements: Extraocular movements intact.      Conjunctiva/sclera: Conjunctivae normal.      Pupils: Pupils are equal, round, and reactive to light.   Cardiovascular:      Rate and Rhythm: Normal rate and regular rhythm.      Heart sounds: No murmur heard.  Pulmonary:      Effort: Pulmonary effort is normal. No respiratory distress.      Breath sounds: Normal breath sounds. No wheezing, rhonchi or rales.   Abdominal:      General: Abdomen is flat. Bowel sounds are normal. There is no distension.      Palpations: Abdomen is soft.      Tenderness: There is no abdominal tenderness. There is no guarding or rebound.   Musculoskeletal:         General: No deformity or signs of injury.      Cervical back: Normal range of motion and neck supple.   Skin:     General: Skin is warm and dry.      Findings: No rash.   Neurological:      Mental Status: She is alert and oriented to person, place, and time.      Cranial Nerves: Facial asymmetry (Questionable very slight drooping of the left upper lip, otherwise cranial nerves are intact) present.      Sensory: No sensory deficit.      Motor: No weakness.   Psychiatric:         Behavior: Behavior normal.           Emergency Department Course   ECG  ECG taken at 1158, ECG read at 1219  Normal sinus rhythm  Septal infarct, age undetermined    When compared with prior ECG, dated 8/18/17, no significant change noted.  Rate 61 bpm. AZ interval 150 ms. QRS duration 82 ms. QT/QTc 418/420 ms. P-R-T axes 70  38 70.    Imaging:  CTA Head Neck with Contrast   Final Result   IMPRESSION:    HEAD CTA:    1.  No evidence of proximal large vessel occlusion or flow-limiting stenosis.      NECK CTA:   1.  No evidence of hemodynamically significant stenosis based on NASCET criteria.   2.  No evidence for dissection.      CT Head w/o Contrast   Final Result   IMPRESSION:   1.  No evidence of acute hemorrhage or other acute intracranial abnormality.      MR Brain w/o & w Contrast    (Results Pending)     Laboratory:  Labs Ordered and Resulted from Time of ED Arrival to Time of ED Departure   BASIC METABOLIC PANEL - Abnormal       Result Value    Sodium 137      Potassium 4.3      Chloride 99      Carbon Dioxide (CO2) 26      Anion Gap 12      Urea Nitrogen 14.0      Creatinine 0.78      GFR Estimate 87      Calcium 9.9      Glucose 121 (*)    CRP INFLAMMATION - Abnormal    CRP Inflammation 10.56 (*)    ERYTHROCYTE SEDIMENTATION RATE AUTO - Normal    Erythrocyte Sedimentation Rate 16     CBC WITH PLATELETS AND DIFFERENTIAL    WBC Count 5.0      RBC Count 4.73      Hemoglobin 14.1      Hematocrit 42.0      MCV 89      MCH 29.8      MCHC 33.6      RDW 13.1      Platelet Count 224      % Neutrophils 60      % Lymphocytes 30      % Monocytes 7      % Eosinophils 2      % Basophils 1      % Immature Granulocytes 0      NRBCs per 100 WBC 0      Absolute Neutrophils 3.0      Absolute Lymphocytes 1.5      Absolute Monocytes 0.3      Absolute Eosinophils 0.1      Absolute Basophils 0.0      Absolute Immature Granulocytes 0.0      Absolute NRBCs 0.0          Emergency Department Course & Assessments:  Interventions:  Medications   CT Scan Flush (80 mLs Intravenous $Given 3/17/24 1417)   iopamidol (ISOVUE-370) solution 500 mL (67 mLs Intravenous $Given 3/17/24 1417)      Independent Interpretation (X-rays, CTs, rhythm strip):  I independently reviewed the head CT and see no obvious intracranial bleed per my read.      Assessments/Consultations/Discussion of Management or Tests:  ED Course as of 24 1604   Sun Mar 17, 2024   1210 I obtained history and examined the patient as noted above.    1439 I rechecked and updated the patient.      Social Determinants of Health affecting care:  None     Disposition:  Care of the patient was transferred to my colleague, Dr. Bliss, pending MRI results.    Impression & Plan    CMS Diagnoses: None    Medical Decision Makin-year-old female presenting with transient vision changes and mild headache.  I am most concerned about a possible TIA or stroke given that she had bilateral peripheral visual symptoms.  These seem to be essentially resolved at this point as her visual fields are all intact on exam.  She does note some mild blurring laterally but can still count fingers laterally.  There is also questionable very slight drooping of the left upper lip but she has no other significant facial droop and no numbness or weakness elsewhere.  Stroke alert was not called given the minimal symptoms and the patient is already on Eliquis and would not be a tPA candidate.  Other possible causes include an ocular migraine, or a primary ocular issue such as retinal detachment, however think this is unlikely given that she had bilateral eye symptoms and they have significantly improved since onset.  Initial workup included labs, EKG, and CT/CTA of the head and neck.  These were fortunately unremarkable.  Will plan for an MRI to ensure that there is no signs of any acute stroke.  Assuming her MRI is normal, we can likely safely discharge her and recommend prompt ophthalmology follow-up tomorrow.  Patient will be endorsed to Dr. Bliss pending MRI results.      Diagnosis:    ICD-10-CM    1. Vision changes  H53.9       2. Acute nonintractable headache, unspecified headache type  R51.9            Discharge Medications:  New Prescriptions    No medications on file      Scribe Disclosure:  FAITH FORRESTER  EDGAR, am serving as a scribe at 12:40 PM on 3/17/2024 to document services personally performed by Prateek Smith MD based on my observations and the provider's statements to me.    3/17/2024   Prateek Smith MD Goodwin, Shaun M, MD  03/17/24 1607

## 2024-03-17 NOTE — ED PROVIDER NOTES
Patient signed out to me by Dr. Pavon.  Please see initial note for further details.  Plan at time of signout was disposition pending MRI of the brain.  MRI as noted below.  No obvious acute infarct.  Occipital findings as below.  Unclear neurology but could represent migraine according to radiology.  She feels she likely had a migraine with ocular symptoms which certainly could be, however discussed the abnormal MRI findings.  She feels like her vision is completely back to normal and has no neurologic complaints and like to go home.  I discussed my plan to speak with stroke/neurology, but, she does not want to do that would like to just discharged home.  I discussed with her that she absolutely needs to follow-up with a neurologist and I placed a referral for neurology follow-up and she needs to do this is soon as possible and she is in agreement and understanding.  I recommended she return if she develop any recurrence of symptoms or new neurologic symptoms.  She is in agreement feels comfortable this plan.  I again offered to talk to stroke/neurology, however, she declined and would just like to discharge home.  She appears of sound mind and is up and ambulating and acting appropriately with no obvious neurologic deficits.  I welcomed her to return if she develops any new or worsening symptoms and she is understanding.    MR Brain w/o & w Contrast   Final Result   IMPRESSION:      1.  Areas of linear FLAIR hyperintense signal abnormality are seen within sulci over both occipital lobes with at least one area of corresponding mild linear leptomeningeal enhancement on the left. No significant susceptibility artifact in these areas or    evidence of acute subarachnoid hemorrhage on same-day CT study. While uncommon, reversible sulcal FLAIR hyperintensity in this distribution has been described in patients with migraine headaches. In the appropriate clinical setting, meningitis could    have a similar MRI  appearance as well. Suggest clinical correlation and follow-up brain MRI with and without contrast in 3-4 weeks to document resolution/improvement or sooner should the clinical need arise.   2.  No evidence of acute infarct.      CTA Head Neck with Contrast   Final Result   IMPRESSION:    HEAD CTA:    1.  No evidence of proximal large vessel occlusion or flow-limiting stenosis.      NECK CTA:   1.  No evidence of hemodynamically significant stenosis based on NASCET criteria.   2.  No evidence for dissection.      CT Head w/o Contrast   Final Result   IMPRESSION:   1.  No evidence of acute hemorrhage or other acute intracranial abnormality.             Lai Bliss MD  03/17/24 1938

## 2024-03-17 NOTE — ED NOTES
Peripheral vision pt seeing nahid lines, blurry vision centrally and also seeing large black dots. Slight headache over left eye. Slight left sided upper lip corner mouth droop. Symptoms started around 11, worst of symptoms lasted about 1 hour. Symptoms have improved, but blurry vision continues. A&O4 pt had a ablation last wend and is on a blood thinner since the 6th.

## 2024-03-17 NOTE — ED TRIAGE NOTES
Headache over left eye. Possibly an ocular migraine per patient. She had an ablation on Wednesday and is on eliquis. Having changes in vision in both eyes with a dark spot in the middle. Denies confusion, trouble with speech, unilateral weakness. Started roughly around 1100. Pain in the back since the ablation

## 2024-03-18 ENCOUNTER — MEDICAL CORRESPONDENCE (OUTPATIENT)
Dept: HEALTH INFORMATION MANAGEMENT | Facility: CLINIC | Age: 59
End: 2024-03-18
Payer: COMMERCIAL

## 2024-03-18 LAB
ATRIAL RATE - MUSE: 61 BPM
DIASTOLIC BLOOD PRESSURE - MUSE: NORMAL MMHG
INTERPRETATION ECG - MUSE: NORMAL
P AXIS - MUSE: 70 DEGREES
PR INTERVAL - MUSE: 150 MS
QRS DURATION - MUSE: 82 MS
QT - MUSE: 418 MS
QTC - MUSE: 420 MS
R AXIS - MUSE: 38 DEGREES
SYSTOLIC BLOOD PRESSURE - MUSE: NORMAL MMHG
T AXIS - MUSE: 70 DEGREES
VENTRICULAR RATE- MUSE: 61 BPM

## 2024-06-01 ENCOUNTER — HEALTH MAINTENANCE LETTER (OUTPATIENT)
Age: 59
End: 2024-06-01

## 2024-12-22 ENCOUNTER — HEALTH MAINTENANCE LETTER (OUTPATIENT)
Age: 59
End: 2024-12-22